# Patient Record
Sex: MALE | Race: WHITE | NOT HISPANIC OR LATINO | Employment: OTHER | URBAN - METROPOLITAN AREA
[De-identification: names, ages, dates, MRNs, and addresses within clinical notes are randomized per-mention and may not be internally consistent; named-entity substitution may affect disease eponyms.]

---

## 2017-12-20 ENCOUNTER — GENERIC CONVERSION - ENCOUNTER (OUTPATIENT)
Dept: OTHER | Facility: OTHER | Age: 72
End: 2017-12-20

## 2021-01-20 DIAGNOSIS — Z23 ENCOUNTER FOR IMMUNIZATION: ICD-10-CM

## 2021-01-22 ENCOUNTER — IMMUNIZATIONS (OUTPATIENT)
Dept: FAMILY MEDICINE CLINIC | Facility: HOSPITAL | Age: 76
End: 2021-01-22

## 2021-01-22 DIAGNOSIS — Z23 ENCOUNTER FOR IMMUNIZATION: Primary | ICD-10-CM

## 2021-01-22 PROCEDURE — 0011A SARS-COV-2 / COVID-19 MRNA VACCINE (MODERNA) 100 MCG: CPT

## 2021-01-22 PROCEDURE — 91301 SARS-COV-2 / COVID-19 MRNA VACCINE (MODERNA) 100 MCG: CPT

## 2021-02-17 ENCOUNTER — IMMUNIZATIONS (OUTPATIENT)
Dept: FAMILY MEDICINE CLINIC | Facility: HOSPITAL | Age: 76
End: 2021-02-17

## 2021-02-17 DIAGNOSIS — Z23 ENCOUNTER FOR IMMUNIZATION: Primary | ICD-10-CM

## 2021-02-17 PROCEDURE — 0012A SARS-COV-2 / COVID-19 MRNA VACCINE (MODERNA) 100 MCG: CPT

## 2021-02-17 PROCEDURE — 91301 SARS-COV-2 / COVID-19 MRNA VACCINE (MODERNA) 100 MCG: CPT

## 2022-07-04 ENCOUNTER — APPOINTMENT (EMERGENCY)
Dept: RADIOLOGY | Facility: HOSPITAL | Age: 77
DRG: 871 | End: 2022-07-04
Payer: MEDICARE

## 2022-07-04 ENCOUNTER — HOSPITAL ENCOUNTER (INPATIENT)
Facility: HOSPITAL | Age: 77
LOS: 4 days | Discharge: HOME | DRG: 871 | End: 2022-07-08
Attending: EMERGENCY MEDICINE | Admitting: STUDENT IN AN ORGANIZED HEALTH CARE EDUCATION/TRAINING PROGRAM
Payer: MEDICARE

## 2022-07-04 DIAGNOSIS — L03.90 CELLULITIS: Primary | ICD-10-CM

## 2022-07-04 LAB
ALBUMIN SERPL BCP-MCNC: 2.6 G/DL (ref 3.5–5)
ALP SERPL-CCNC: 77 U/L (ref 46–116)
ALT SERPL W P-5'-P-CCNC: 28 U/L (ref 12–78)
ANION GAP SERPL CALCULATED.3IONS-SCNC: 8 MMOL/L (ref 4–13)
APTT PPP: 23 SECONDS (ref 23–37)
AST SERPL W P-5'-P-CCNC: 18 U/L (ref 5–45)
BASOPHILS # BLD MANUAL: 0 THOUSAND/UL (ref 0–0.1)
BASOPHILS NFR MAR MANUAL: 0 % (ref 0–1)
BILIRUB SERPL-MCNC: 1.24 MG/DL (ref 0.2–1)
BUN SERPL-MCNC: 30 MG/DL (ref 5–25)
CALCIUM ALBUM COR SERPL-MCNC: 9.6 MG/DL (ref 8.3–10.1)
CALCIUM SERPL-MCNC: 8.5 MG/DL (ref 8.3–10.1)
CHLORIDE SERPL-SCNC: 98 MMOL/L (ref 100–108)
CO2 SERPL-SCNC: 25 MMOL/L (ref 21–32)
CREAT SERPL-MCNC: 1.16 MG/DL (ref 0.6–1.3)
EOSINOPHIL # BLD MANUAL: 0.17 THOUSAND/UL (ref 0–0.4)
EOSINOPHIL NFR BLD MANUAL: 1 % (ref 0–6)
ERYTHROCYTE [DISTWIDTH] IN BLOOD BY AUTOMATED COUNT: 16.5 % (ref 11.6–15.1)
GFR SERPL CREATININE-BSD FRML MDRD: 59 ML/MIN/1.73SQ M
GLUCOSE SERPL-MCNC: 136 MG/DL (ref 65–140)
HCT VFR BLD AUTO: 42.1 % (ref 36.5–49.3)
HGB BLD-MCNC: 13.6 G/DL (ref 12–17)
INR PPP: 1.16 (ref 0.84–1.19)
LACTATE SERPL-SCNC: 2.7 MMOL/L (ref 0.5–2)
LYMPHOCYTES # BLD AUTO: 1.04 THOUSAND/UL (ref 0.6–4.47)
LYMPHOCYTES # BLD AUTO: 6 % (ref 14–44)
MCH RBC QN AUTO: 32.9 PG (ref 26.8–34.3)
MCHC RBC AUTO-ENTMCNC: 32.3 G/DL (ref 31.4–37.4)
MCV RBC AUTO: 102 FL (ref 82–98)
MONOCYTES # BLD AUTO: 0.69 THOUSAND/UL (ref 0–1.22)
MONOCYTES NFR BLD: 4 % (ref 4–12)
NEUTROPHILS # BLD MANUAL: 15.41 THOUSAND/UL (ref 1.85–7.62)
NEUTS BAND NFR BLD MANUAL: 2 % (ref 0–8)
NEUTS SEG NFR BLD AUTO: 87 % (ref 43–75)
PLATELET # BLD AUTO: 186 THOUSANDS/UL (ref 149–390)
PLATELET BLD QL SMEAR: ADEQUATE
PMV BLD AUTO: 9.1 FL (ref 8.9–12.7)
POTASSIUM SERPL-SCNC: 4.8 MMOL/L (ref 3.5–5.3)
PROCALCITONIN SERPL-MCNC: 0.17 NG/ML
PROT SERPL-MCNC: 7.5 G/DL (ref 6.4–8.2)
PROTHROMBIN TIME: 14.3 SECONDS (ref 11.6–14.5)
RBC # BLD AUTO: 4.14 MILLION/UL (ref 3.88–5.62)
SODIUM SERPL-SCNC: 131 MMOL/L (ref 136–145)
WBC # BLD AUTO: 17.31 THOUSAND/UL (ref 4.31–10.16)

## 2022-07-04 PROCEDURE — 84145 PROCALCITONIN (PCT): CPT

## 2022-07-04 PROCEDURE — 99285 EMERGENCY DEPT VISIT HI MDM: CPT

## 2022-07-04 PROCEDURE — 83605 ASSAY OF LACTIC ACID: CPT

## 2022-07-04 PROCEDURE — 85610 PROTHROMBIN TIME: CPT

## 2022-07-04 PROCEDURE — 73590 X-RAY EXAM OF LOWER LEG: CPT

## 2022-07-04 PROCEDURE — 85027 COMPLETE CBC AUTOMATED: CPT

## 2022-07-04 PROCEDURE — 80053 COMPREHEN METABOLIC PANEL: CPT

## 2022-07-04 PROCEDURE — 85007 BL SMEAR W/DIFF WBC COUNT: CPT

## 2022-07-04 PROCEDURE — 99223 1ST HOSP IP/OBS HIGH 75: CPT

## 2022-07-04 PROCEDURE — 96365 THER/PROPH/DIAG IV INF INIT: CPT

## 2022-07-04 PROCEDURE — 82948 REAGENT STRIP/BLOOD GLUCOSE: CPT

## 2022-07-04 PROCEDURE — 36415 COLL VENOUS BLD VENIPUNCTURE: CPT

## 2022-07-04 PROCEDURE — 93005 ELECTROCARDIOGRAM TRACING: CPT

## 2022-07-04 PROCEDURE — 87040 BLOOD CULTURE FOR BACTERIA: CPT

## 2022-07-04 PROCEDURE — 1123F ACP DISCUSS/DSCN MKR DOCD: CPT | Performed by: INTERNAL MEDICINE

## 2022-07-04 PROCEDURE — 81001 URINALYSIS AUTO W/SCOPE: CPT

## 2022-07-04 PROCEDURE — 85730 THROMBOPLASTIN TIME PARTIAL: CPT

## 2022-07-04 RX ORDER — ALLOPURINOL 100 MG/1
100 TABLET ORAL DAILY
COMMUNITY

## 2022-07-04 RX ORDER — LEVOTHYROXINE SODIUM 0.12 MG/1
125 TABLET ORAL DAILY
COMMUNITY

## 2022-07-04 RX ORDER — ATORVASTATIN CALCIUM 20 MG/1
20 TABLET, FILM COATED ORAL DAILY
COMMUNITY

## 2022-07-04 RX ORDER — FUROSEMIDE 40 MG/1
40 TABLET ORAL DAILY
COMMUNITY

## 2022-07-04 RX ORDER — SULFASALAZINE 500 MG/1
1000 TABLET ORAL 2 TIMES DAILY
Status: DISCONTINUED | OUTPATIENT
Start: 2022-07-05 | End: 2022-07-08 | Stop reason: HOSPADM

## 2022-07-04 RX ORDER — CEFAZOLIN SODIUM 2 G/50ML
2000 SOLUTION INTRAVENOUS EVERY 8 HOURS
Status: DISCONTINUED | OUTPATIENT
Start: 2022-07-05 | End: 2022-07-08 | Stop reason: HOSPADM

## 2022-07-04 RX ORDER — ENOXAPARIN SODIUM 100 MG/ML
40 INJECTION SUBCUTANEOUS DAILY
Status: DISCONTINUED | OUTPATIENT
Start: 2022-07-05 | End: 2022-07-05

## 2022-07-04 RX ORDER — INSULIN LISPRO 100 [IU]/ML
1-6 INJECTION, SOLUTION INTRAVENOUS; SUBCUTANEOUS
Status: DISCONTINUED | OUTPATIENT
Start: 2022-07-04 | End: 2022-07-08 | Stop reason: HOSPADM

## 2022-07-04 RX ORDER — CARVEDILOL 12.5 MG/1
12.5 TABLET ORAL 2 TIMES DAILY WITH MEALS
Status: DISCONTINUED | OUTPATIENT
Start: 2022-07-05 | End: 2022-07-08 | Stop reason: HOSPADM

## 2022-07-04 RX ORDER — BENAZEPRIL HYDROCHLORIDE 40 MG/1
40 TABLET, FILM COATED ORAL DAILY
COMMUNITY

## 2022-07-04 RX ORDER — OXYCODONE HYDROCHLORIDE 10 MG/1
10 TABLET ORAL EVERY 6 HOURS PRN
Status: DISCONTINUED | OUTPATIENT
Start: 2022-07-04 | End: 2022-07-08 | Stop reason: HOSPADM

## 2022-07-04 RX ORDER — CARVEDILOL 12.5 MG/1
12.5 TABLET ORAL 2 TIMES DAILY WITH MEALS
COMMUNITY

## 2022-07-04 RX ORDER — ATORVASTATIN CALCIUM 20 MG/1
20 TABLET, FILM COATED ORAL DAILY
Status: DISCONTINUED | OUTPATIENT
Start: 2022-07-05 | End: 2022-07-08 | Stop reason: HOSPADM

## 2022-07-04 RX ORDER — LISINOPRIL 10 MG/1
40 TABLET ORAL DAILY
Status: DISCONTINUED | OUTPATIENT
Start: 2022-07-05 | End: 2022-07-06

## 2022-07-04 RX ORDER — INSULIN LISPRO 100 [IU]/ML
2-12 INJECTION, SOLUTION INTRAVENOUS; SUBCUTANEOUS
Status: DISCONTINUED | OUTPATIENT
Start: 2022-07-05 | End: 2022-07-08 | Stop reason: HOSPADM

## 2022-07-04 RX ORDER — OXYCODONE HYDROCHLORIDE 15 MG/1
10 TABLET ORAL EVERY 6 HOURS PRN
COMMUNITY

## 2022-07-04 RX ORDER — ALLOPURINOL 100 MG/1
100 TABLET ORAL DAILY
Status: DISCONTINUED | OUTPATIENT
Start: 2022-07-05 | End: 2022-07-08 | Stop reason: HOSPADM

## 2022-07-04 RX ORDER — FUROSEMIDE 40 MG/1
40 TABLET ORAL DAILY
Status: DISCONTINUED | OUTPATIENT
Start: 2022-07-05 | End: 2022-07-08 | Stop reason: HOSPADM

## 2022-07-04 RX ORDER — LEVOTHYROXINE SODIUM 0.12 MG/1
125 TABLET ORAL DAILY
Status: DISCONTINUED | OUTPATIENT
Start: 2022-07-05 | End: 2022-07-08 | Stop reason: HOSPADM

## 2022-07-04 RX ORDER — SULFASALAZINE 500 MG/1
1000 TABLET ORAL 2 TIMES DAILY
COMMUNITY

## 2022-07-04 RX ADMIN — CEFEPIME HYDROCHLORIDE 2000 MG: 2 INJECTION, POWDER, FOR SOLUTION INTRAVENOUS at 21:02

## 2022-07-04 RX ADMIN — SODIUM CHLORIDE 1000 ML: 0.9 INJECTION, SOLUTION INTRAVENOUS at 22:10

## 2022-07-05 PROBLEM — N17.9 ACUTE KIDNEY INJURY (HCC): Status: ACTIVE | Noted: 2022-07-05

## 2022-07-05 LAB
ANION GAP SERPL CALCULATED.3IONS-SCNC: 11 MMOL/L (ref 4–13)
BACTERIA UR QL AUTO: ABNORMAL /HPF
BASOPHILS # BLD AUTO: 0.03 THOUSANDS/ΜL (ref 0–0.1)
BASOPHILS NFR BLD AUTO: 0 % (ref 0–1)
BILIRUB UR QL STRIP: ABNORMAL
BUN SERPL-MCNC: 29 MG/DL (ref 5–25)
CALCIUM SERPL-MCNC: 7.6 MG/DL (ref 8.3–10.1)
CHLORIDE SERPL-SCNC: 98 MMOL/L (ref 100–108)
CLARITY UR: ABNORMAL
CO2 SERPL-SCNC: 25 MMOL/L (ref 21–32)
COLOR UR: ABNORMAL
CREAT SERPL-MCNC: 1.52 MG/DL (ref 0.6–1.3)
EOSINOPHIL # BLD AUTO: 0.11 THOUSAND/ΜL (ref 0–0.61)
EOSINOPHIL NFR BLD AUTO: 1 % (ref 0–6)
ERYTHROCYTE [DISTWIDTH] IN BLOOD BY AUTOMATED COUNT: 16.5 % (ref 11.6–15.1)
GFR SERPL CREATININE-BSD FRML MDRD: 43 ML/MIN/1.73SQ M
GLUCOSE SERPL-MCNC: 110 MG/DL (ref 65–140)
GLUCOSE SERPL-MCNC: 120 MG/DL (ref 65–140)
GLUCOSE SERPL-MCNC: 131 MG/DL (ref 65–140)
GLUCOSE SERPL-MCNC: 138 MG/DL (ref 65–140)
GLUCOSE SERPL-MCNC: 149 MG/DL (ref 65–140)
GLUCOSE SERPL-MCNC: 199 MG/DL (ref 65–140)
GLUCOSE UR STRIP-MCNC: NEGATIVE MG/DL
HCT VFR BLD AUTO: 37 % (ref 36.5–49.3)
HGB BLD-MCNC: 12.2 G/DL (ref 12–17)
HGB UR QL STRIP.AUTO: NEGATIVE
HYALINE CASTS #/AREA URNS LPF: ABNORMAL /LPF
IMM GRANULOCYTES # BLD AUTO: 0.09 THOUSAND/UL (ref 0–0.2)
IMM GRANULOCYTES NFR BLD AUTO: 1 % (ref 0–2)
KETONES UR STRIP-MCNC: NEGATIVE MG/DL
LACTATE SERPL-SCNC: 2.1 MMOL/L (ref 0.5–2)
LEUKOCYTE ESTERASE UR QL STRIP: NEGATIVE
LYMPHOCYTES # BLD AUTO: 0.84 THOUSANDS/ΜL (ref 0.6–4.47)
LYMPHOCYTES NFR BLD AUTO: 7 % (ref 14–44)
MCH RBC QN AUTO: 33.4 PG (ref 26.8–34.3)
MCHC RBC AUTO-ENTMCNC: 33 G/DL (ref 31.4–37.4)
MCV RBC AUTO: 101 FL (ref 82–98)
MONOCYTES # BLD AUTO: 0.69 THOUSAND/ΜL (ref 0.17–1.22)
MONOCYTES NFR BLD AUTO: 6 % (ref 4–12)
MUCOUS THREADS UR QL AUTO: ABNORMAL
NEUTROPHILS # BLD AUTO: 9.55 THOUSANDS/ΜL (ref 1.85–7.62)
NEUTS SEG NFR BLD AUTO: 85 % (ref 43–75)
NITRITE UR QL STRIP: NEGATIVE
NON-SQ EPI CELLS URNS QL MICRO: ABNORMAL /HPF
NRBC BLD AUTO-RTO: 0 /100 WBCS
PH UR STRIP.AUTO: 5.5 [PH]
PLATELET # BLD AUTO: 291 THOUSANDS/UL (ref 149–390)
PMV BLD AUTO: 9.2 FL (ref 8.9–12.7)
POTASSIUM SERPL-SCNC: 4.2 MMOL/L (ref 3.5–5.3)
PROT UR STRIP-MCNC: ABNORMAL MG/DL
RBC # BLD AUTO: 3.65 MILLION/UL (ref 3.88–5.62)
RBC #/AREA URNS AUTO: ABNORMAL /HPF
SODIUM SERPL-SCNC: 134 MMOL/L (ref 136–145)
SP GR UR STRIP.AUTO: >=1.03 (ref 1–1.03)
UROBILINOGEN UR QL STRIP.AUTO: 0.2 E.U./DL
WBC # BLD AUTO: 11.31 THOUSAND/UL (ref 4.31–10.16)
WBC #/AREA URNS AUTO: ABNORMAL /HPF

## 2022-07-05 PROCEDURE — 82948 REAGENT STRIP/BLOOD GLUCOSE: CPT

## 2022-07-05 PROCEDURE — 80048 BASIC METABOLIC PNL TOTAL CA: CPT

## 2022-07-05 PROCEDURE — 99232 SBSQ HOSP IP/OBS MODERATE 35: CPT | Performed by: INTERNAL MEDICINE

## 2022-07-05 PROCEDURE — 36415 COLL VENOUS BLD VENIPUNCTURE: CPT

## 2022-07-05 PROCEDURE — 85025 COMPLETE CBC W/AUTO DIFF WBC: CPT

## 2022-07-05 RX ORDER — HEPARIN SODIUM 5000 [USP'U]/ML
5000 INJECTION, SOLUTION INTRAVENOUS; SUBCUTANEOUS EVERY 8 HOURS SCHEDULED
Status: DISCONTINUED | OUTPATIENT
Start: 2022-07-05 | End: 2022-07-08 | Stop reason: HOSPADM

## 2022-07-05 RX ORDER — NYSTATIN 100000 [USP'U]/G
POWDER TOPICAL 2 TIMES DAILY
Status: DISCONTINUED | OUTPATIENT
Start: 2022-07-05 | End: 2022-07-05

## 2022-07-05 RX ORDER — ONDANSETRON 2 MG/ML
4 INJECTION INTRAMUSCULAR; INTRAVENOUS EVERY 8 HOURS PRN
Status: DISCONTINUED | OUTPATIENT
Start: 2022-07-05 | End: 2022-07-08 | Stop reason: HOSPADM

## 2022-07-05 RX ORDER — NYSTATIN 100000 [USP'U]/G
POWDER TOPICAL 2 TIMES DAILY
Status: DISCONTINUED | OUTPATIENT
Start: 2022-07-05 | End: 2022-07-08 | Stop reason: HOSPADM

## 2022-07-05 RX ADMIN — LISINOPRIL 40 MG: 10 TABLET ORAL at 09:07

## 2022-07-05 RX ADMIN — SULFASALAZINE 1000 MG: 500 TABLET ORAL at 17:40

## 2022-07-05 RX ADMIN — HEPARIN SODIUM 5000 UNITS: 5000 INJECTION INTRAVENOUS; SUBCUTANEOUS at 21:46

## 2022-07-05 RX ADMIN — OXYCODONE HYDROCHLORIDE 10 MG: 10 TABLET ORAL at 00:07

## 2022-07-05 RX ADMIN — CARVEDILOL 12.5 MG: 12.5 TABLET, FILM COATED ORAL at 09:07

## 2022-07-05 RX ADMIN — CEFAZOLIN SODIUM 2000 MG: 2 SOLUTION INTRAVENOUS at 09:13

## 2022-07-05 RX ADMIN — CEFAZOLIN SODIUM 2000 MG: 2 SOLUTION INTRAVENOUS at 16:23

## 2022-07-05 RX ADMIN — SULFASALAZINE 1000 MG: 500 TABLET ORAL at 09:07

## 2022-07-05 RX ADMIN — OXYCODONE HYDROCHLORIDE 10 MG: 10 TABLET ORAL at 09:25

## 2022-07-05 RX ADMIN — ATORVASTATIN CALCIUM 20 MG: 20 TABLET, FILM COATED ORAL at 09:07

## 2022-07-05 RX ADMIN — OXYCODONE HYDROCHLORIDE 10 MG: 10 TABLET ORAL at 16:23

## 2022-07-05 RX ADMIN — ENOXAPARIN SODIUM 40 MG: 40 INJECTION SUBCUTANEOUS at 09:08

## 2022-07-05 RX ADMIN — LEVOTHYROXINE SODIUM 125 MCG: 125 TABLET ORAL at 09:07

## 2022-07-05 RX ADMIN — ALLOPURINOL 100 MG: 100 TABLET ORAL at 12:17

## 2022-07-05 RX ADMIN — HEPARIN SODIUM 5000 UNITS: 5000 INJECTION INTRAVENOUS; SUBCUTANEOUS at 14:02

## 2022-07-05 RX ADMIN — FUROSEMIDE 40 MG: 40 TABLET ORAL at 09:07

## 2022-07-05 RX ADMIN — CARVEDILOL 12.5 MG: 12.5 TABLET, FILM COATED ORAL at 16:23

## 2022-07-05 NOTE — ASSESSMENT & PLAN NOTE
· Sodium slightly improving  · Possibly in the setting of home diuretic usage   · Continue to monitor

## 2022-07-05 NOTE — PROGRESS NOTES
[facility]  Progress Note - Jennie Aldridge 1945, 64 y o  male MRN: [id number]  Unit/Bed#: ED 12 Encounter: [id number]  Primary Care Provider: No primary care provider on file  Date and time admitted to hospital: 7/4/2022  7:37 PM    * Cellulitis of right lower extremity  Assessment & Plan  · Patient states that pain in right lower extremity has improved at this time  · RLE with uniform erythematous rash, warm and tender to palpation without underlying fluctuance felt  · Meets SEPSIS criteria as below   · X-ray right lower extremity showing soft tissue swelling  · With no SOB, chest pain, hypoxia lower suspicion of PE/DVT  · Blood culture x2 pending  · ED gave IV cefepime  · Continue IV cefazolin  · trend WBC, follow up with pending infectious labs, prn pain control     Acute kidney injury ([facility] )  Assessment & Plan  · Baseline creatinine appears to be around 1  · Creatinine 1 52 currently  · Patient did receive Lasix today    If creatinine up trending will likely discontinue Lasix tomorrow  · Continue to monitor    CHF (congestive heart failure) ([facility] )  Assessment & Plan  Wt Readings from Last 3 Encounters:   07/04/22 125 kg (275 lb)     · Patient denies any shortness of breath on examination  · Lower extremities are swollen but patient states this is his baseline  · Continue home lasix and other home HTN medications  · Continue to monitor    Sepsis ([facility] )  Assessment & Plan  · As evidenced by HR >90, WBC 17 31, and RLE cellulitis as above   · Plan as above  · Receiving IV abx as above    Hyponatremia  Assessment & Plan  · Sodium slightly improving  · Possibly in the setting of home diuretic usage   · Continue to monitor    T2DM (type 2 diabetes mellitus) ([facility] )  Assessment & Plan  · Hold home metformin  · Correctional SSI  · Hypoglycemia protocol  · Diabetic diet   (P) [phone] [id number]      Rheumatoid arthritis ([facility] )  Assessment & Plan  · Continue home sulfasalazine         VTE Pharmacologic Prophylaxis: VTE Score: 6 Moderate Risk (Score 3-4) - Pharmacological DVT Prophylaxis Ordered: heparin  Patient Centered Rounds: I performed bedside rounds with nursing staff today  Discussions with Specialists or Other Care Team Provider: case management    Education and Discussions with Family / Patient: Patient declined call to   Time Spent for Care: 30 minutes  More than 50% of total time spent on counseling and coordination of care as described above  Current Length of Stay: 1 day(s)  Current Patient Status: Inpatient   Certification Statement: The patient will continue to require additional inpatient hospital stay due to cellulitis  Discharge Plan: Anticipate discharge in 48-72 hrs to home  Code Status: Level 1 - Full Code    Subjective:   Patient resting comfortably on examination  Patient states that his lower extremity pain has improved compared to yesterday  Patient had no other complaints on exam     Objective:     Vitals:   Temp (24hrs), Av 5 °F (36 9 °C), Min:98 5 °F (36 9 °C), Max:98 5 °F (36 9 °C)    Temp:  [98 5 °F (36 9 °C)] 98 5 °F (36 9 °C)  HR:  [60-99] 60  Resp:  [14-20] 16  BP: (119-125)/(57-68) 123/58  SpO2:  [95 %-98 %] 95 %  Body mass index is 38 35 kg/m²  Input and Output Summary (last 24 hours): Intake/Output Summary (Last 24 hours) at 2022 1324  Last data filed at 2022 1301  Gross per 24 hour   Intake 1120 ml   Output 805 ml   Net 315 ml       Physical Exam:   Physical Exam  Vitals and nursing note reviewed  Constitutional:       General: He is not in acute distress  Appearance: He is well-developed  HENT:      Head: Normocephalic and atraumatic  Eyes:      General: No scleral icterus  Conjunctiva/sclera: Conjunctivae normal    Cardiovascular:      Rate and Rhythm: Normal rate and regular rhythm  Heart sounds: Normal heart sounds  No murmur heard  No friction rub  No gallop     Pulmonary:      Effort: Pulmonary effort is normal  No respiratory distress  Breath sounds: Normal breath sounds  No wheezing or rales  Abdominal:      General: Bowel sounds are normal  There is no distension  Palpations: Abdomen is soft  Tenderness: There is no abdominal tenderness  Musculoskeletal:         General: Normal range of motion  Comments: Right lower extremity with erythema noted  Right lower extremity warmer when compared to left  Minimal tenderness to palpation right lower extremity   Skin:     General: Skin is warm  Findings: No rash  Neurological:      Mental Status: He is alert and oriented to person, place, and time            Additional Data:     Labs:  Results from last 7 days   Lab Units 07/05/22  0506 07/04/22  2042   WBC Thousand/uL 11 31* 17 31*   HEMOGLOBIN g/dL 12 2 13 6   HEMATOCRIT % 37 0 42 1   PLATELETS Thousands/uL 291 186   BANDS PCT %  --  2   NEUTROS PCT % 85*  --    LYMPHS PCT % 7*  --    LYMPHO PCT %  --  6*   MONOS PCT % 6  --    MONO PCT %  --  4   EOS PCT % 1 1     Results from last 7 days   Lab Units 07/05/22  0941 07/04/22  2042   SODIUM mmol/L 134* 131*   POTASSIUM mmol/L 4 2 4 8   CHLORIDE mmol/L 98* 98*   CO2 mmol/L 25 25   BUN mg/dL 29* 30*   CREATININE mg/dL 1 52* 1 16   ANION GAP mmol/L 11 8   CALCIUM mg/dL 7 6* 8 5   ALBUMIN g/dL  --  2 6*   TOTAL BILIRUBIN mg/dL  --  1 24*   ALK PHOS U/L  --  77   ALT U/L  --  28   AST U/L  --  18   GLUCOSE RANDOM mg/dL 199* 136     Results from last 7 days   Lab Units 07/04/22  2042   INR  1 16     Results from last 7 days   Lab Units 07/05/22  1051 07/05/22  0657 07/04/22  2350   POC GLUCOSE mg/dl 138 110 149*         Results from last 7 days   Lab Units 07/04/22  2359 07/04/22  2042   LACTIC ACID mmol/L 2 1* 2 7*   PROCALCITONIN ng/ml  --  0 17       Lines/Drains:  Invasive Devices  Report    Peripheral Intravenous Line  Duration           Peripheral IV 07/04/22 Left Antecubital 1 day    Peripheral IV 07/04/22 Right Hand <1 day                      Imaging: No pertinent imaging reviewed  Recent Cultures (last 7 days):   Results from last 7 days   Lab Units 07/04/22 2048 07/04/22 2042   BLOOD CULTURE  Received in Microbiology Lab  Culture in Progress  Received in Microbiology Lab  Culture in Progress  Last 24 Hours Medication List:   Current Facility-Administered Medications   Medication Dose Route Frequency Provider Last Rate    allopurinol  100 mg Oral Daily Zara [place], [place]      atorvastatin  20 mg Oral Daily [facility] [place]      carvedilol  12 5 mg Oral BID With Meals Zara [place]      cefazolin  2,000 mg Intravenous Q8H Zara Pagan PA-C 2,000 mg (07/05/22 0913)    furosemide  40 mg Oral Daily Laya Schwab Theodore ALFONSO lin      heparin (porcine)  5,000 Units Subcutaneous [facility] Drewjaret Wolf, DO      insulin lispro  1-6 Units Subcutaneous HS Ellan Schwab [place], [place]      insulin lispro  2-12 Units Subcutaneous TID AC Laya Schwab [place], [place]      levothyroxine  125 mcg Oral Daily Ellan Schwab [place], [place]      lisinopril  40 mg Oral Daily Ellan Schwab [place], [place]      ondansetron  4 mg Intravenous Q8H PRN Drewjaret Wolf, DO      oxyCODONE  10 mg Oral Q6H PRN Ellan Schwab [place], [place]      sulfaSALAzine  1,000 mg Oral BID Zara P[place], [place]          Today, Patient Was Seen By: Drew Wolf DO    **Please Note: This note may have been constructed using a voice recognition system  **

## 2022-07-05 NOTE — ASSESSMENT & PLAN NOTE
Wt Readings from Last 3 Encounters:   07/04/22 125 kg (275 lb)     · Patient denies any shortness of breath on examination  · Lower extremities are swollen but patient states this is his baseline  · Continue home lasix and other home HTN medications  · Continue to monitor

## 2022-07-05 NOTE — DISCHARGE INSTR - OTHER ORDERS
Skin care plans:  1-Cleanse sacro-buttocks with soap and water  Apply TRIAD paste to open areas on open area on buttock at least twice a day and as needed with jacky-care  2-Hydraguard to bilateral heel twice a day and as needed  3-Elevate heels to offload pressure  4-Ehob cushion when out of bed  5-Turn/repoisiton every 2 hours for pressure re-distribution on skin  6-Moisturize skin daily with skin nourishing cream    7- Right leg wounds- Cleanse with Normal Saline solution, pat dry  Apply a piece of Maxorb calcium alginate over open wound bed, cover with 4x4 gauze, then ABD pad, and wrap with Lizzy  Change once or twice a day depending on amount of drainage from wound  8- Elevate lower extremities frequently throughout the day when sitting down  Follow-up with [facility] wound care center as an outpatient- call [phone] for appt

## 2022-07-05 NOTE — ED PROVIDER NOTES
History  Chief Complaint   Patient presents with    Leg Pain     Pt c/o R lower leg pain with swelling & rash     Patient is a 64year old male presenting to the ED with a complaint of right leg pain  Reports the pain started this morning at 10am  Reports he noticed a painful red patch on the back of the right leg  Reports the patch has since spread to the entire leg  Reports he went to urgent care, they noted he had a fever and told him to go to the ED for further evaluation  Reports he has been having chills today  Reports the pain associated feels like a pressure throughout the lower leg  Reports taking his daily oxycodone for his arthritis which lessened the pain in the leg  Reports it is painful to touch and painful to weight bear  Reports having a history of rheumatoid arthritis and bilateral lower extremity edema  Reports the degree of swelling he has today is what he typically has  Reports having a slight "pink" coloration to his shins at baseline  Denies any known injury to the area  Reports previous knee replacement of the right knee 4 years ago  Denies itching, fevers, headache, weakness, dizziness, visual changes, abdominal pain, nausea, vomiting, diarrhea, constipation, chest pain, shortness of breath or difficulty breathing  Does not offer any other concerns or complaints        History provided by:  Patient and spouse   used: No    Leg Pain  Location:  Leg  Time since incident:  10 hours  Leg location:  R lower leg  Pain details:     Quality:  Pressure    Radiates to:  Does not radiate    Severity:  Mild    Onset quality:  Sudden    Duration:  10 hours    Timing:  Constant    Progression:  Worsening  Prior injury to area:  No  Worsened by:  Bearing weight  Associated symptoms: swelling    Associated symptoms: no back pain, no decreased ROM, no fatigue, no fever, no itching, no muscle weakness, no neck pain, no numbness, no stiffness and no tingling    Swelling:     Location:  Leg (right lower)    Onset quality:  Sudden    Duration:  10 hours    Timing:  Constant    Progression:  Worsening      None       Past Medical History:   Diagnosis Date    Arthritis     Edema     Rheumatoid arthritis ([address] )        No past surgical history on file  No family history on file  I have reviewed and agree with the history as documented  E-Cigarette/Vaping     E-Cigarette/Vaping Substances          Review of Systems   Constitutional: Negative for chills, fatigue and fever  HENT: Negative for ear pain and sore throat  Eyes: Negative for pain and visual disturbance  Respiratory: Negative for cough and shortness of breath  Cardiovascular: Negative for chest pain and palpitations  Gastrointestinal: Negative for abdominal pain and vomiting  Genitourinary: Negative for dysuria and hematuria  Musculoskeletal: Negative for arthralgias, back pain, neck pain and stiffness  Right lower leg pain   Skin: Positive for rash (right lower leg)  Negative for color change and itching  Neurological: Negative for seizures and syncope  All other systems reviewed and are negative  Physical Exam  Physical Exam  Vitals and nursing note reviewed  Constitutional:       Appearance: Normal appearance  HENT:      Head: Normocephalic and atraumatic  Right Ear: External ear normal       Left Ear: External ear normal       Nose: Nose normal       Mouth/Throat:      Mouth: Mucous membranes are moist    Eyes:      General: No scleral icterus  Right eye: No discharge  Left eye: No discharge  Conjunctiva/sclera: Conjunctivae normal    Cardiovascular:      Rate and Rhythm: Normal rate  Pulmonary:      Effort: Pulmonary effort is normal  No respiratory distress  Abdominal:      General: Abdomen is flat  Bowel sounds are normal       Tenderness: There is no abdominal tenderness  Musculoskeletal:         General: No swelling, deformity or signs of injury   Normal range of motion  Cervical back: Normal range of motion and neck supple  No rigidity  Skin:     General: Skin is warm and dry  Capillary Refill: Capillary refill takes less than 2 seconds  Coloration: Skin is not jaundiced  Findings: Erythema present  Comments: Erythema of the anterior and posterior right lower leg  The erythema spare the right foot  Neurological:      General: No focal deficit present  Mental Status: He is alert and oriented to person, place, and time  Mental status is at baseline  Cranial Nerves: No cranial nerve deficit  Gait: Gait normal    Psychiatric:         Mood and Affect: Mood normal          Behavior: Behavior normal          Thought Content: Thought content normal          Judgment: Judgment normal          Vital Signs  ED Triage Vitals [07/04/22 1931]   Temperature Pulse Respirations Blood Pressure SpO2   98 5 °F (36 9 °C) 99 20 125/60 98 %      Temp src Heart Rate Source Patient Position - Orthostatic VS BP Location FiO2 (%)   -- Monitor Sitting Left arm --      Pain Score       --           Vitals:    07/04/22 1931   BP: 125/60   Pulse: 99   Patient Position - Orthostatic VS: Sitting         Visual Acuity      ED Medications  Medications   sodium chloride 0 9 % bolus 1,000 mL (1,000 mL Intravenous New Bag 7/4/22 2210)   cefepime (MAXIPIME) 2 g/50 mL dextrose IVPB (0 mg Intravenous Stopped 7/4/22 2149)       Diagnostic Studies  Results Reviewed     Procedure Component Value Units Date/Time    CBC and differential [[id number]]  (Abnormal) Collected: 07/04/22 2042    Lab Status: Final result Specimen: Blood from Arm, Left Updated: 07/04/22 2148     WBC 17 31 Thousand/uL      RBC 4 14 Million/uL      Hemoglobin 13 6 g/dL      Hematocrit 42 1 %       fL      MCH 32 9 pg      MCHC 32 3 g/dL      RDW 16 5 %      MPV 9 1 fL      Platelets 628 Thousands/uL     Narrative: This is an appended report    These results have been appended to a previously verified report  Manual Differential(PHLEBS Do Not Order) [[id number]]  (Abnormal) Collected: 07/04/22 2042    Lab Status: Final result Specimen: Blood from Arm, Left Updated: 07/04/22 2148     Segmented % 87 %      Bands % 2 %      Lymphocytes % 6 %      Monocytes % 4 %      Eosinophils, % 1 %      Basophils % 0 %      Absolute Neutrophils 15 41 Thousand/uL      Lymphocytes Absolute 1 04 Thousand/uL      Monocytes Absolute 0 69 Thousand/uL      Eosinophils Absolute 0 17 Thousand/uL      Basophils Absolute 0 00 Thousand/uL      Total Counted --     Platelet Estimate Adequate    Lactic acid [[id number]]  (Abnormal) Collected: 07/04/22 2042    Lab Status: Final result Specimen: Blood from Arm, Left Updated: 07/04/22 2129     LACTIC ACID 2 7 mmol/L     Narrative:      Result may be elevated if tourniquet was used during collection      Lactic acid 2 Hours [[id number]]     Lab Status: No result Specimen: Blood     Protime-INR [[id number]]  (Normal) Collected: 07/04/22 2042    Lab Status: Final result Specimen: Blood from Arm, Left Updated: 07/04/22 2128     Protime 14 3 seconds      INR 1 16    APTT [[id number]]  (Normal) Collected: 07/04/22 2042    Lab Status: Final result Specimen: Blood from Arm, Left Updated: 07/04/22 2128     PTT 23 seconds     Procalcitonin [[id number]]  (Normal) Collected: 07/04/22 2042    Lab Status: Final result Specimen: Blood from Arm, Left Updated: 07/04/22 2124     Procalcitonin 0 17 ng/ml     Comprehensive metabolic panel [[id number]]  (Abnormal) Collected: 07/04/22 2042    Lab Status: Final result Specimen: Blood from Arm, Left Updated: 07/04/22 2112     Sodium 131 mmol/L      Potassium 4 8 mmol/L      Chloride 98 mmol/L      CO2 25 mmol/L      ANION GAP 8 mmol/L      BUN 30 mg/dL      Creatinine 1 16 mg/dL      Glucose 136 mg/dL      Calcium 8 5 mg/dL      Corrected Calcium 9 6 mg/dL      AST 18 U/L      ALT 28 U/L      Alkaline Phosphatase 77 U/L      Total Protein 7 5 g/dL Albumin 2 6 g/dL      Total Bilirubin 1 24 mg/dL      eGFR 59 ml/min/1 73sq m     Narrative:      Isaias guidelines for Chronic Kidney Disease (CKD):     Stage 1 with normal or high GFR (GFR > 90 mL/min/1 73 square meters)    Stage 2 Mild CKD (GFR = 60-89 mL/min/1 73 square meters)    Stage 3A Moderate CKD (GFR = 45-59 mL/min/1 73 square meters)    Stage 3B Moderate CKD (GFR = 30-44 mL/min/1 73 square meters)    Stage 4 Severe CKD (GFR = 15-29 mL/min/1 73 square meters)    Stage 5 End Stage CKD (GFR <15 mL/min/1 73 square meters)  Note: GFR calculation is accurate only with a steady state creatinine    Blood culture #2 [[id number]] Collected: 07/04/22 2048    Lab Status: In process Specimen: Blood Updated: 07/04/22 2048    Blood culture #1 [[id number]] Collected: 07/04/22 2042    Lab Status: No result Specimen: Blood from Arm, Left     UA w Reflex to Microscopic w Reflex to Culture [[id number]]     Lab Status: No result Specimen: Urine                  XR tibia fibula 2 views RIGHT    (Results Pending)              Procedures  Procedures         ED Course             MDM  Number of Diagnoses or Management Options  Cellulitis: new and requires workup  Diagnosis management comments: This is a 64year old male presetning to the ED with a painful right lower leg  Reports noticing the pain and a small red rash this morning at 10am  Reports the rash spread from the posterior lower right leg to the entirety of the right lower leg  Reports the pain is a pressure sensation  Denies fevers, headache, weakness, dizziness, visual changes, abdominal pain, nausea, vomiting, diarrhea, constipation, chest pain, shortness of breath or difficulty breathing      Differential diagnosis to include but is not limited to: cellulitis, sepsis    Initial ED Plan: CBC, CMP, lactic acid, procalcitonin, PT/PTT, blood culture, UA, EKG, Xray right tibia/fibula    -risks and benefits were discussed about Sepsis protocol of 30cc/kg of fluids  Patient refused the protocol fluid resuscitation due to swelling of bilateral legs, stated he would rather 1 liter of fluid with reevaluation  ED results: elevated WBC, lactic 2 7    Final ED assessment: Patient is stable and well appearing  Patient verbalized understanding and is agreeable with the plan for admission  Case was discussed with Jax Agustin, inpatient under Dr Claudette Bloomer  Bridging orders placed  Amount and/or Complexity of Data Reviewed  Clinical lab tests: ordered and reviewed  Tests in the radiology section of CPT®: ordered and reviewed  Tests in the medicine section of CPT®: ordered and reviewed  Obtain history from someone other than the patient: yes  Discuss the patient with other providers: yes  Independent visualization of images, tracings, or specimens: yes        Disposition  Final diagnoses:   Cellulitis     Time reflects when diagnosis was documented in both MDM as applicable and the Disposition within this note     Time User Action Codes Description Comment    7/4/2022 10:26 PM Farida Brigette Add [L03 90] Cellulitis     7/4/2022 10:27 PM Farida Brigette Add [A41 9,  R65 20] Severe sepsis ([address] )     7/4/2022 10:27 PM Farida Brigette Remove [A41 9,  R65 20] Severe sepsis [facility])       ED Disposition     ED Disposition   Admit    Condition   Stable    Date/Time   Mon Jul 4, 2022 10:26 PM    Comment   Case was discussed with Jax Agustin and the patient's admission status was agreed to be Admission Status: inpatient status to the service of Dr Claudette Bloomer   Follow-up Information    None         Patient's Medications    No medications on file       No discharge procedures on file      PDMP Review     None          ED Provider  Electronically Signed by           Cortez Lance PA-C  07/04/22 7997

## 2022-07-05 NOTE — ASSESSMENT & PLAN NOTE
· As evidenced by HR >90, WBC 17 31, and RLE cellulitis as above   · Plan as above  · Receiving IV abx as above

## 2022-07-05 NOTE — ASSESSMENT & PLAN NOTE
· Hold home metformin  · Correctional SSI  · Hypoglycemia protocol  · Diabetic diet   (P) [phone]

## 2022-07-05 NOTE — ASSESSMENT & PLAN NOTE
Reports development of painful red patch on his right calf this AM  This rash has now spread to the entire RLE  He went to urgent care who reportedly found he was febrile and requested he be evaluated in ED  Reports his home oxy dosing has helped with the pain  Denies other symptom or complaint at this time      /60 (BP Location: Left arm)   Pulse 99   Temp 98 5 °F (36 9 °C)   Resp 20   Ht 5' 11" (1 803 m)   Wt 125 kg (275 lb)   SpO2 98%   BMI 38 35 kg/m²     · Reporting RLE painful red patch on the calf this AM, which has spread to the entire lower extremity  · Reports going to urgent care who found he was febrile and suggested ED presentation  · RLE with uniform erythematous rash, warm and tender to palpation without underlying fluctuance felt  · Reports improvement in pain with home oxy  · Meets SEPSIS criteria as below   · XR RLE pending official read   · With no SOB, chest pain, hypoxia lower suspicion of PE/DVT  · ED gave IV cefepime  · Continue IV cefepime, trend WBC, follow up with pending infectious labs, prn pain control

## 2022-07-05 NOTE — NURSING NOTE
Patient reports feeling uncomfortable while laying on stretcher with legs elevated  Per patient request, pillow and blankets removed from under feet  Importance of elevating lower extremities explained

## 2022-07-05 NOTE — ED NOTES
Patient in bell bed  PIV access obtained  x1 set of Blood Cx obtained at this time  PA aware       Andrea Sharp RN  07/04/22 4803

## 2022-07-05 NOTE — WOUND OSTOMY CARE
Progress Note - Wound   Yahir Reyes 64 y o  male MRN: [id number]  Unit/Bed#: ED 12 Encounter: [id number]      Assessment:   Patient admitted due to cellulitis of right lower extremity  History arthritis, edema, RA, CHF, diabetes  Wound care consulted for sacral wounds  Patient agreeable to assessment, alert and oriented x 4, continent of bowel and bladder, standby assist to stand at edge of stretcher for assessment, is independent with most care  Primary RN made aware of assessment findings  Patient states buttock wound occurred approx 2 weeks ago due to sitting in his recliner often and wife has been assisting with wound care  1  Pressure injury bilateral buttock, stage 3- POA- Wounds located on each buttock, pictured and measured together  Wounds are oval in shape, full-thickness, approx  40% yellow adhered tissue and 60% non-blanchable beefy red and pink tissue, with scant amount of serous drainage noted  Jacky-wounds are dry, intact, blanchable pink skin, blanchable red skin, no warmth, no induration  2  Bilateral sacrum and heels- skin is dry, intact, blanchable pink  3  Right lower extremity- Skin is dry, intact, no open aspects, no drainage noted  Skin is erythematous, edematous, and warm to touch- SLIM aware and patient being treated for cellulitis  Educated patient on importance of frequent offloading of pressure via turning, repositioning, and weight-shifting  Verbalized understanding of plan of care  No induration, fluctuance, odor, warmth, or purulence noted to the above noted wounds  Patient tolerated well, reports mild pain to the wounds  Primary nurse aware of plan of care  See flow sheets for more detailed assessment findings  Will follow along  Skin care plans:  1-Cleanse sacro-buttocks with soap and water  Apply TRIAD paste to open areas on each buttock at least twice a day and as needed with jacky-care   May keep open to air due to a dressing for this area is difficult  2-Hydraguard to bilateral heel BID and PRN  3-Elevate heels to offload pressure  4-Ehob cushion when out of bed  5-Turn/repoisiton q2h for pressure re-distribution on skin  6-Moisturize skin daily with skin nourishing cream    7-Apply Accu-max pump to mattress  Wound 07/05/22 Buttocks (Active)   Wound Image   07/05/22 1206   Wound Description Beefy red;Yellow 07/05/22 1206   Pressure Injury Stage 3 07/05/22 1206   Irina-wound Assessment Dry; Intact; Pink 07/05/22 1206   Wound Length (cm) 2 cm 07/05/22 1206   Wound Width (cm) 3 cm 07/05/22 1206   Wound Depth (cm) 0 2 cm 07/05/22 1206   Wound Surface Area (cm^2) 6 cm^2 07/05/22 1206   Wound Volume (cm^3) 1 2 cm^3 07/05/22 1206   Calculated Wound Volume (cm^3) 1 2 cm^3 07/05/22 1206   Tunneling 0 cm 07/05/22 1206   Undermining 0 07/05/22 1206   Drainage Amount Scant 07/05/22 1206   Drainage Description Serous 07/05/22 1206   Non-staged Wound Description Full thickness 07/05/22 1206   Treatments Cleansed;Irrigation with NSS;Site care 07/05/22 1206   Dressing Protective barrier 07/05/22 1206   Wound packed?  No 07/05/22 1206   Dressing Changed New 07/05/22 1206   Patient Tolerance Tolerated well 07/05/22 1206     Call or tigertext with any questions  Wound Care will continue to follow    Melodie Sebastian care

## 2022-07-05 NOTE — ASSESSMENT & PLAN NOTE
· As evidenced by HR >90, WBC 17 31, and RLE cellulitis as above   · Lactic acid 2 7  · Receiving IV abx as above

## 2022-07-05 NOTE — ASSESSMENT & PLAN NOTE
· Patient states that pain in right lower extremity has improved at this time  · RLE with uniform erythematous rash, warm and tender to palpation without underlying fluctuance felt  · Meets SEPSIS criteria as below   · X-ray right lower extremity showing soft tissue swelling  · With no SOB, chest pain, hypoxia lower suspicion of PE/DVT  · Blood culture x2 pending  · ED gave IV cefepime  · Continue IV cefazolin  · trend WBC, follow up with pending infectious labs, prn pain control

## 2022-07-05 NOTE — ASSESSMENT & PLAN NOTE
· Baseline creatinine appears to be around 1  · Creatinine 1 52 currently  · Patient did receive Lasix today    If creatinine up trending will likely discontinue Lasix tomorrow  · Continue to monitor

## 2022-07-05 NOTE — ASSESSMENT & PLAN NOTE
Wt Readings from Last 3 Encounters:   07/04/22 125 kg (275 lb)     · Denies SOB, not hypoxic on RA; lower suspicion of exacerbation  · Continue home lasix and other home HTN medications  · Monitor for s/s of volume overload

## 2022-07-05 NOTE — PLAN OF CARE
Problem: PAIN - ADULT  Goal: Verbalizes/displays adequate comfort level or baseline comfort level  Description: Interventions:  - Encourage patient to monitor pain and request assistance  - Assess pain using appropriate pain scale  - Administer analgesics based on type and severity of pain and evaluate response  - Implement non-pharmacological measures as appropriate and evaluate response  - Consider cultural and social influences on pain and pain management  - Notify physician/advanced practitioner if interventions unsuccessful or patient reports new pain  Outcome: Progressing     Problem: INFECTION - ADULT  Goal: Absence or prevention of progression during hospitalization  Description: INTERVENTIONS:  - Assess and monitor for signs and symptoms of infection  - Monitor lab/diagnostic results  - Monitor all insertion sites, i e  indwelling lines, tubes, and drains  - Monitor endotracheal if appropriate and nasal secretions for changes in amount and color  - [place] appropriate cooling/warming therapies per order  - Administer medications as ordered  - Instruct and encourage patient and family to use good hand hygiene technique  - Identify and instruct in appropriate isolation precautions for identified infection/condition  Outcome: Progressing

## 2022-07-05 NOTE — H&P
[facility]  H&P- Adriana Westbrook 1945, 64 y o  male MRN: [id number]  Unit/Bed#: ED 12 Encounter: [id number]  Primary Care Provider: No primary care provider on file  Date and time admitted to hospital: 7/4/2022  7:37 PM    * Cellulitis of right lower extremity  Assessment & Plan  Reports development of painful red patch on his right calf this AM  This rash has now spread to the entire RLE  He went to urgent care who reportedly found he was febrile and requested he be evaluated in ED  Reports his home oxy dosing has helped with the pain  Denies other symptom or complaint at this time      /60 (BP Location: Left arm)   Pulse 99   Temp 98 5 °F (36 9 °C)   Resp 20   Ht 5' 11" (1 803 m)   Wt 125 kg (275 lb)   SpO2 98%   BMI 38 35 kg/m²     · Reporting RLE painful red patch on the calf this AM, which has spread to the entire lower extremity  · Reports going to urgent care who found he was febrile and suggested ED presentation  · RLE with uniform erythematous rash, warm and tender to palpation without underlying fluctuance felt  · Reports improvement in pain with home oxy  · Meets SEPSIS criteria as below   · XR RLE pending official read   · With no SOB, chest pain, hypoxia lower suspicion of PE/DVT  · ED gave IV cefepime  · Continue IV cefepime, trend WBC, follow up with pending infectious labs, prn pain control     Sepsis ([facility] )  Assessment & Plan  · As evidenced by HR >90, WBC 17 31, and RLE cellulitis as above   · Lactic acid 2 7  · Receiving IV abx as above    Hyponatremia  Assessment & Plan  · Sodium 132  · Possibly in the setting of home diuretic usage   · Trend BMP    T2DM (type 2 diabetes mellitus) ([facility] )  Assessment & Plan  · Hold home metformin  · Correctional SSI  · Hypoglycemia protocol  · Diabetic diet         CHF (congestive heart failure) ([facility])  Assessment & Plan  Wt Readings from Last 3 Encounters:   07/04/22 125 kg (275 lb)     · Denies SOB, not hypoxic on RA; lower suspicion of exacerbation  · Continue home lasix and other home HTN medications  · Monitor for s/s of volume overload     Rheumatoid arthritis ([facility] )  Assessment & Plan  · Continue home sulfasalazine     VTE Pharmacologic Prophylaxis: VTE Score: 6 High Risk (Score >/= 5) - Pharmacological DVT Prophylaxis Ordered: enoxaparin (Lovenox)  Sequential Compression Devices Ordered  Code Status: Level 1 - Full Code   Discussion with family: update in AM      Anticipated Length of Stay: Patient will be admitted on an inpatient basis with an anticipated length of stay of greater than 2 midnights secondary to RLE cellulitis   Total Time for Visit, including Counseling / Coordination of Care: 60 minutes Greater than 50% of this total time spent on direct patient counseling and coordination of care  Chief Complaint: RLE redness and pain    History of Present Illness:  Stefanie Porter is a 64 y o  male with a PMH of RA, T2DM, hypothyroidism, CHF, HTN, and HLP who presents with RLE redness and pain  Reports development of painful red patch on his right calf this AM  This rash has now spread to the entire RLE  He went to urgent care who reportedly found he was febrile and requested he be evaluated in ED  Reports his home oxy dosing has helped with the pain  Denies other symptom or complaint at this time  All questions answered at the bedside to the best of my ability  Review of Systems:  Review of Systems   Constitutional: Negative for activity change, appetite change, chills, diaphoresis, fatigue and fever  HENT: Negative for congestion, ear pain, nosebleeds and trouble swallowing  Eyes: Negative for pain and visual disturbance  Respiratory: Negative for apnea, cough, chest tightness, shortness of breath and wheezing  Cardiovascular: Negative for chest pain, palpitations and leg swelling     Gastrointestinal: Negative for abdominal distention, abdominal pain, blood in stool, constipation, diarrhea, nausea and vomiting  Endocrine: Negative for cold intolerance, heat intolerance and polyuria  Genitourinary: Negative for difficulty urinating, dysuria, flank pain and hematuria  Musculoskeletal: Negative for arthralgias, neck pain and neck stiffness  RLE tender rash     Skin: Positive for rash (red RLE rash)  Negative for color change and wound  Neurological: Negative for dizziness, tremors, syncope, weakness, light-headedness, numbness and headaches  Hematological: Negative for adenopathy  All other systems reviewed and are negative  Past Medical and Surgical History:   Past Medical History:   Diagnosis Date    Arthritis     Edema     Rheumatoid arthritis ([facility] )        No past surgical history on file  Meds/Allergies:  Prior to Admission medications    Medication Sig Start Date End Date Taking? Authorizing Provider   allopurinol (ZYLOPRIM) 100 mg tablet Take 100 mg by mouth daily   Yes Historical Provider, MD   atorvastatin (LIPITOR) 20 mg tablet Take 20 mg by mouth daily   Yes Historical Provider, MD   benazepril (LOTENSIN) 40 MG tablet Take 40 mg by mouth daily   Yes Historical Provider, MD   carvedilol (COREG) 12 5 mg tablet Take 12 5 mg by mouth 2 (two) times a day with meals   Yes Historical Provider, MD   furosemide (LASIX) 40 mg tablet Take 40 mg by mouth daily   Yes Historical Provider, MD   levothyroxine 125 mcg tablet Take 125 mcg by mouth daily   Yes Historical Provider, MD   metFORMIN (GLUCOPHAGE) 500 mg tablet Take 500 mg by mouth 2 (two) times a day with meals   Yes Historical Provider, MD   oxyCODONE (ROXICODONE) 15 mg immediate release tablet Take 10 mg by mouth every 6 (six) hours as needed for severe pain   Yes Historical Provider, MD   sulfaSALAzine (AZULFIDINE) 500 mg tablet Take 1,000 mg by mouth 2 (two) times a day   Yes Historical Provider, MD     I have reviewed home medications with patient personally  Allergies:    Allergies   Allergen Reactions    Levaquin [Levofloxacin] Rash       Social History:  Marital Status: /Civil Union   Occupation: N/A  Patient Pre-hospital Living Situation: Home  Patient Pre-hospital Level of Mobility: walks  Patient Pre-hospital Diet Restrictions: diabetic   Substance Use History:   Social History     Substance and Sexual Activity   Alcohol Use Not on file     Social History     Tobacco Use   Smoking Status Not on file   Smokeless Tobacco Not on file     Social History     Substance and Sexual Activity   Drug Use Not on file       Family History:  No family history on file  Physical Exam:     Vitals:   Blood Pressure: 120/57 (07/05/22 0001)  Pulse: 79 (07/05/22 0001)  Temperature: 98 5 °F (36 9 °C) (07/04/22 1931)  Respirations: 15 (07/05/22 0001)  Height: 5' 11" (180 3 cm) (07/04/22 1931)  Weight - Scale: 125 kg (275 lb) (07/04/22 1931)  SpO2: 97 % (07/05/22 0001)    Physical Exam  Vitals and nursing note reviewed  Constitutional:       General: He is not in acute distress  Appearance: Normal appearance  HENT:      Head: Normocephalic and atraumatic  Right Ear: External ear normal       Left Ear: External ear normal       Nose: Nose normal       Mouth/Throat:      Mouth: Mucous membranes are moist    Eyes:      Pupils: Pupils are equal, round, and reactive to light  Cardiovascular:      Rate and Rhythm: Normal rate and regular rhythm  Pulses: Normal pulses  Heart sounds: Normal heart sounds  No murmur heard  Pulmonary:      Effort: Pulmonary effort is normal  No respiratory distress  Breath sounds: Normal breath sounds  No wheezing or rales  Chest:      Chest wall: No tenderness  Abdominal:      General: Bowel sounds are normal  There is no distension  Palpations: Abdomen is soft  There is no mass  Tenderness: There is no abdominal tenderness  There is no guarding  Musculoskeletal:         General: Tenderness (RLE; no underlying fluctuance felt ) present  No swelling        Cervical back: Normal range of motion and neck supple  No rigidity or tenderness  Right lower leg: No edema  Left lower leg: No edema  Skin:     General: Skin is warm and dry  Capillary Refill: Capillary refill takes less than 2 seconds  Findings: Erythema (RLE) present  No lesion or rash  Neurological:      General: No focal deficit present  Mental Status: He is alert  Psychiatric:         Mood and Affect: Mood normal           Additional Data:     Lab Results:  Results from last 7 days   Lab Units 07/04/22 2042   WBC Thousand/uL 17 31*   HEMOGLOBIN g/dL 13 6   HEMATOCRIT % 42 1   PLATELETS Thousands/uL 186   BANDS PCT % 2   LYMPHO PCT % 6*   MONO PCT % 4   EOS PCT % 1     Results from last 7 days   Lab Units 07/04/22 2042   SODIUM mmol/L 131*   POTASSIUM mmol/L 4 8   CHLORIDE mmol/L 98*   CO2 mmol/L 25   BUN mg/dL 30*   CREATININE mg/dL 1 16   ANION GAP mmol/L 8   CALCIUM mg/dL 8 5   ALBUMIN g/dL 2 6*   TOTAL BILIRUBIN mg/dL 1 24*   ALK PHOS U/L 77   ALT U/L 28   AST U/L 18   GLUCOSE RANDOM mg/dL 136     Results from last 7 days   Lab Units 07/04/22  2042   INR  1 16             Results from last 7 days   Lab Units 07/04/22 2042   LACTIC ACID mmol/L 2 7*   PROCALCITONIN ng/ml 0 17       Imaging: Personally reviewed the following imaging: xray(s)  XR tibia fibula 2 views RIGHT    (Results Pending)       EKG and Other Studies Reviewed on Admission:   · EKG: pending EPIC upload   ** Please Note: This note has been constructed using a voice recognition system   **

## 2022-07-06 PROBLEM — L89.90 PRESSURE ULCER: Status: ACTIVE | Noted: 2022-07-06

## 2022-07-06 LAB
ANION GAP SERPL CALCULATED.3IONS-SCNC: 8 MMOL/L (ref 4–13)
ATRIAL RATE: 91 BPM
BASOPHILS # BLD AUTO: 0.01 THOUSANDS/ΜL (ref 0–0.1)
BASOPHILS NFR BLD AUTO: 0 % (ref 0–1)
BUN SERPL-MCNC: 31 MG/DL (ref 5–25)
CALCIUM SERPL-MCNC: 8 MG/DL (ref 8.3–10.1)
CHLORIDE SERPL-SCNC: 105 MMOL/L (ref 100–108)
CO2 SERPL-SCNC: 25 MMOL/L (ref 21–32)
CREAT SERPL-MCNC: 1.18 MG/DL (ref 0.6–1.3)
EOSINOPHIL # BLD AUTO: 0.35 THOUSAND/ΜL (ref 0–0.61)
EOSINOPHIL NFR BLD AUTO: 6 % (ref 0–6)
ERYTHROCYTE [DISTWIDTH] IN BLOOD BY AUTOMATED COUNT: 16.3 % (ref 11.6–15.1)
GFR SERPL CREATININE-BSD FRML MDRD: 58 ML/MIN/1.73SQ M
GLUCOSE SERPL-MCNC: 103 MG/DL (ref 65–140)
GLUCOSE SERPL-MCNC: 104 MG/DL (ref 65–140)
GLUCOSE SERPL-MCNC: 106 MG/DL (ref 65–140)
GLUCOSE SERPL-MCNC: 145 MG/DL (ref 65–140)
HCT VFR BLD AUTO: 32.7 % (ref 36.5–49.3)
HGB BLD-MCNC: 10.5 G/DL (ref 12–17)
IMM GRANULOCYTES # BLD AUTO: 0.04 THOUSAND/UL (ref 0–0.2)
IMM GRANULOCYTES NFR BLD AUTO: 1 % (ref 0–2)
LYMPHOCYTES # BLD AUTO: 0.65 THOUSANDS/ΜL (ref 0.6–4.47)
LYMPHOCYTES NFR BLD AUTO: 10 % (ref 14–44)
MCH RBC QN AUTO: 32.6 PG (ref 26.8–34.3)
MCHC RBC AUTO-ENTMCNC: 32.1 G/DL (ref 31.4–37.4)
MCV RBC AUTO: 102 FL (ref 82–98)
MONOCYTES # BLD AUTO: 0.36 THOUSAND/ΜL (ref 0.17–1.22)
MONOCYTES NFR BLD AUTO: 6 % (ref 4–12)
NEUTROPHILS # BLD AUTO: 4.89 THOUSANDS/ΜL (ref 1.85–7.62)
NEUTS SEG NFR BLD AUTO: 77 % (ref 43–75)
NRBC BLD AUTO-RTO: 0 /100 WBCS
P AXIS: 54 DEGREES
PLATELET # BLD AUTO: 269 THOUSANDS/UL (ref 149–390)
PMV BLD AUTO: 8.9 FL (ref 8.9–12.7)
POTASSIUM SERPL-SCNC: 4.3 MMOL/L (ref 3.5–5.3)
PR INTERVAL: 154 MS
QRS AXIS: 59 DEGREES
QRSD INTERVAL: 128 MS
QT INTERVAL: 376 MS
QTC INTERVAL: 462 MS
RBC # BLD AUTO: 3.22 MILLION/UL (ref 3.88–5.62)
SODIUM SERPL-SCNC: 138 MMOL/L (ref 136–145)
T WAVE AXIS: 32 DEGREES
VENTRICULAR RATE: 91 BPM
WBC # BLD AUTO: 6.3 THOUSAND/UL (ref 4.31–10.16)

## 2022-07-06 PROCEDURE — 85025 COMPLETE CBC W/AUTO DIFF WBC: CPT | Performed by: INTERNAL MEDICINE

## 2022-07-06 PROCEDURE — 82948 REAGENT STRIP/BLOOD GLUCOSE: CPT

## 2022-07-06 PROCEDURE — 93010 ELECTROCARDIOGRAM REPORT: CPT | Performed by: INTERNAL MEDICINE

## 2022-07-06 PROCEDURE — 99232 SBSQ HOSP IP/OBS MODERATE 35: CPT | Performed by: INTERNAL MEDICINE

## 2022-07-06 PROCEDURE — 36415 COLL VENOUS BLD VENIPUNCTURE: CPT | Performed by: INTERNAL MEDICINE

## 2022-07-06 PROCEDURE — 80048 BASIC METABOLIC PNL TOTAL CA: CPT | Performed by: INTERNAL MEDICINE

## 2022-07-06 RX ADMIN — HEPARIN SODIUM 5000 UNITS: 5000 INJECTION INTRAVENOUS; SUBCUTANEOUS at 05:23

## 2022-07-06 RX ADMIN — SULFASALAZINE 1000 MG: 500 TABLET ORAL at 17:35

## 2022-07-06 RX ADMIN — HEPARIN SODIUM 5000 UNITS: 5000 INJECTION INTRAVENOUS; SUBCUTANEOUS at 13:46

## 2022-07-06 RX ADMIN — CEFAZOLIN SODIUM 2000 MG: 2 SOLUTION INTRAVENOUS at 00:04

## 2022-07-06 RX ADMIN — CARVEDILOL 12.5 MG: 12.5 TABLET, FILM COATED ORAL at 17:24

## 2022-07-06 RX ADMIN — HEPARIN SODIUM 5000 UNITS: 5000 INJECTION INTRAVENOUS; SUBCUTANEOUS at 22:05

## 2022-07-06 RX ADMIN — ALLOPURINOL 100 MG: 100 TABLET ORAL at 08:11

## 2022-07-06 RX ADMIN — LEVOTHYROXINE SODIUM 125 MCG: 125 TABLET ORAL at 08:07

## 2022-07-06 RX ADMIN — SULFASALAZINE 1000 MG: 500 TABLET ORAL at 08:11

## 2022-07-06 RX ADMIN — SODIUM CHLORIDE 500 ML: 0.9 INJECTION, SOLUTION INTRAVENOUS at 08:45

## 2022-07-06 RX ADMIN — CEFAZOLIN SODIUM 2000 MG: 2 SOLUTION INTRAVENOUS at 08:12

## 2022-07-06 RX ADMIN — CEFAZOLIN SODIUM 2000 MG: 2 SOLUTION INTRAVENOUS at 17:14

## 2022-07-06 NOTE — ED NOTES
Pt adjusted to the side of the bed to eat breakfast  Pt states he is feeling better and asked for toast and coffee to be added to his breakfast  Kitchen called to place order      Khurram Busch RN  07/06/22 0966

## 2022-07-06 NOTE — ASSESSMENT & PLAN NOTE
· Right lower extremity swelling and erythema improving  · Meets SEPSIS criteria as below   · X-ray right lower extremity showing soft tissue swelling  · Blood culture with no growth at 1 day  · Continue IV cefazolin  · trend WBC, follow up with pending infectious labs, prn pain control

## 2022-07-06 NOTE — ED NOTES
Patient legs elevated and turned to left side, pulled up in bed   Advised to not take off b/p cuff and pulse ox     Petra Ledezma RN  07/06/22 7019

## 2022-07-06 NOTE — ASSESSMENT & PLAN NOTE
· Hold home metformin  · Correctional SSI  · Hypoglycemia protocol  · Diabetic diet   (P) [phone] [phone]

## 2022-07-06 NOTE — ASSESSMENT & PLAN NOTE
· Right lower extremity swelling and erythema improving  · Meets SEPSIS criteria as below   · X-ray right lower extremity showing soft tissue swelling  · Blood culture with no growth at 2 day  · Continue IV cefazolin  · trend WBC, follow up with pending infectious labs, prn pain control

## 2022-07-06 NOTE — PROGRESS NOTES
[facility]  Progress Note - Darcy Sake 1945, 64 y o  male MRN: [id number]  Unit/Bed#: ED 12 Encounter: [id number]  Primary Care Provider: No primary care provider on file  Date and time admitted to hospital: 7/4/2022  7:37 PM    * Cellulitis of right lower extremity  Assessment & Plan  · Right lower extremity swelling and erythema improving  · Meets SEPSIS criteria as below   · X-ray right lower extremity showing soft tissue swelling  · Blood culture with no growth at 1 day  · Continue IV cefazolin  · trend WBC, follow up with pending infectious labs, prn pain control     Acute kidney injury ([facility] )  Assessment & Plan  · Baseline creatinine appears to be around 1  · Creatinine back to baseline  · Continue to monitor    CHF (congestive heart failure) ([facility] )  Assessment & Plan  Wt Readings from Last 3 Encounters:   07/04/22 125 kg (275 lb)     · Patient denies any shortness of breath on examination  · Lower extremities are swollen but patient states this is his baseline  · Continue home lasix   · Continue to monitor    Sepsis ([facility] )  Assessment & Plan  · As evidenced by HR >90, WBC 17 31, and RLE cellulitis as above   · Plan as above  · Receiving IV abx as above    Hyponatremia  Assessment & Plan  · Resolved    T2DM (type 2 diabetes mellitus) ([facility] )  Assessment & Plan  · Hold home metformin  · Correctional SSI  · Hypoglycemia protocol  · Diabetic diet   (P) [phone]      Rheumatoid arthritis (HCC)  Assessment & Plan  · Continue home sulfasalazine         VTE Pharmacologic Prophylaxis: VTE Score: 6 Moderate Risk (Score 3-4) - Pharmacological DVT Prophylaxis Ordered: heparin  Patient Centered Rounds: I performed bedside rounds with nursing staff today  Discussions with Specialists or Other Care Team Provider:  Case management    Education and Discussions with Family / Patient: Patient declined call to   Time Spent for Care: 30 minutes   More than 50% of total time spent on counseling and coordination of care as described above  Current Length of Stay: 2 day(s)  Current Patient Status: Inpatient   Certification Statement: The patient will continue to require additional inpatient hospital stay due to Cellulitis  Discharge Plan: Anticipate discharge in 24-48 hrs to home  Code Status: Level 1 - Full Code    Subjective:   Patient resting comfortably on examination  Patient states that his lower extremity swelling has improved today and yesterday pain as a burning  Patient had no other of complaints on exam     Objective:     Vitals:   Temp (24hrs), Av °F (36 7 °C), Min:97 4 °F (36 3 °C), Max:98 6 °F (37 °C)    Temp:  [97 4 °F (36 3 °C)-98 6 °F (37 °C)] 98 6 °F (37 °C)  HR:  [65-77] 75  Resp:  [18-20] 20  BP: ()/(42-59) 122/59  SpO2:  [90 %-97 %] 96 %  Body mass index is 38 35 kg/m²  Input and Output Summary (last 24 hours): Intake/Output Summary (Last 24 hours) at 2022 1200  Last data filed at 2022 1118  Gross per 24 hour   Intake 1270 ml   Output 500 ml   Net 770 ml       Physical Exam:   Physical Exam  Vitals and nursing note reviewed  Constitutional:       General: He is not in acute distress  Appearance: He is well-developed  HENT:      Head: Normocephalic and atraumatic  Eyes:      General: No scleral icterus  Conjunctiva/sclera: Conjunctivae normal    Cardiovascular:      Rate and Rhythm: Normal rate and regular rhythm  Heart sounds: Normal heart sounds  No murmur heard  No friction rub  No gallop  Pulmonary:      Effort: Pulmonary effort is normal  No respiratory distress  Breath sounds: Normal breath sounds  No wheezing or rales  Abdominal:      General: Bowel sounds are normal  There is no distension  Palpations: Abdomen is soft  Tenderness: There is no abdominal tenderness  Musculoskeletal:         General: Normal range of motion        Comments: Right lower extremity swelling improved compared to yesterday  Minimal tenderness to palpation   Skin:     General: Skin is warm  Findings: No rash  Neurological:      Mental Status: He is alert and oriented to person, place, and time  Additional Data:     Labs:  Results from last 7 days   Lab Units 07/06/22  0449 07/05/22  0506 07/04/22  2042   WBC Thousand/uL 6 30   < > 17 31*   HEMOGLOBIN g/dL 10 5*   < > 13 6   HEMATOCRIT % 32 7*   < > 42 1   PLATELETS Thousands/uL 269   < > 186   BANDS PCT %  --   --  2   NEUTROS PCT % 77*   < >  --    LYMPHS PCT % 10*   < >  --    LYMPHO PCT %  --   --  6*   MONOS PCT % 6   < >  --    MONO PCT %  --   --  4   EOS PCT % 6   < > 1    < > = values in this interval not displayed  Results from last 7 days   Lab Units 07/06/22 0449 07/05/22  0941 07/04/22  2042   SODIUM mmol/L 138   < > 131*   POTASSIUM mmol/L 4 3   < > 4 8   CHLORIDE mmol/L 105   < > 98*   CO2 mmol/L 25   < > 25   BUN mg/dL 31*   < > 30*   CREATININE mg/dL 1 18   < > 1 16   ANION GAP mmol/L 8   < > 8   CALCIUM mg/dL 8 0*   < > 8 5   ALBUMIN g/dL  --   --  2 6*   TOTAL BILIRUBIN mg/dL  --   --  1 24*   ALK PHOS U/L  --   --  77   ALT U/L  --   --  28   AST U/L  --   --  18   GLUCOSE RANDOM mg/dL 103   < > 136    < > = values in this interval not displayed  Results from last 7 days   Lab Units 07/04/22  2042   INR  1 16     Results from last 7 days   Lab Units 07/06/22  1128 07/05/22  2119 07/05/22  1616 07/05/22  1051 07/05/22  0657 07/04/22  2350   POC GLUCOSE mg/dl 104 120 131 138 110 149*         Results from last 7 days   Lab Units 07/04/22  2359 07/04/22  2042   LACTIC ACID mmol/L 2 1* 2 7*   PROCALCITONIN ng/ml  --  0 17       Lines/Drains:  Invasive Devices  Report    Peripheral Intravenous Line  Duration           Peripheral IV 07/04/22 Right Hand 1 day    Peripheral IV 07/06/22 Left Wrist <1 day                      Imaging: No pertinent imaging reviewed      Recent Cultures (last 7 days):   Results from last 7 days   Lab Units 07/04/22 2048 07/04/22 2042   BLOOD CULTURE  No Growth at 24 hrs  No Growth at 24 hrs  Last 24 Hours Medication List:   Current Facility-Administered Medications   Medication Dose Route Frequency Provider Last Rate    allopurinol  100 mg Oral Daily [place], [place]      atorvastatin  20 mg Oral Daily [place], [place]      carvedilol  12 5 mg Oral BID With [facility]als [facility] [place]      cefazolin  2,000 mg Intravenous Q8H Fredy [facility] [place] Stopped (07/06/22 1003)    [facility]  [facility]      heparin (porcine)  5,000 Units Subcutaneous [facility] Sunni Alatorre DO      insulin lispro  1-6 Units Subcutaneous HS [place], [place]      insulin lispro  2-12 Units Subcutaneous TID AC [place], [place]      levothyroxine  125 mcg Oral Daily [place], [place]      nystatin   Topical BID Sunni Alatorre DO      ondansetron  4 mg Intravenous Q8H PRN Sunni Alatorre DO      oxyCODONE  10 mg Oral Q6H PRN [place], [place]      sulfaSALAzine  1,000 mg Oral BID Fredy Malik PA-C          Today, Patient Was Seen By: Sunni Alatorre DO    **Please Note: This note may have been constructed using a voice recognition system  **

## 2022-07-06 NOTE — ED NOTES
This writer went in to assess pt due to low BP readings  Pt found sleeping with audible snoring  Pt woken up and readjusted in bed to retake BP which was 107/61  Pt voiced his frustration with the uncomfortable stretcher and still being in the ER   This writer apologized and asked if there was anyway we could assist him with comfort at this time which he decline stating "I'll just wait for the doctors to get here because I have an earful to tell them "      Avni Harris, ISAI  07/06/22 7735

## 2022-07-06 NOTE — ED NOTES
Patient remains on stretcher with wife at bedside, no distress noted     Kirstin Scott RN  07/06/22 [id number]

## 2022-07-06 NOTE — ASSESSMENT & PLAN NOTE
Wt Readings from Last 3 Encounters:   07/04/22 125 kg (275 lb)     · Patient denies any shortness of breath on examination  · Lower extremities are swollen but patient states this is his baseline  · Continue home lasix   · Continue to monitor

## 2022-07-06 NOTE — ED NOTES
Patient noted with arm bent that is taking b/p, unfolded arm and took another b/p     Leydi Wren RN  07/05/22 2041

## 2022-07-06 NOTE — ASSESSMENT & PLAN NOTE
· Hold home metformin  · [employer]  · Hypoglycemia protocol  · Diabetic diet   (P) [phone]

## 2022-07-07 LAB
ANION GAP SERPL CALCULATED.3IONS-SCNC: 8 MMOL/L (ref 4–13)
BASOPHILS # BLD AUTO: 0.02 THOUSANDS/ΜL (ref 0–0.1)
BASOPHILS NFR BLD AUTO: 1 % (ref 0–1)
BUN SERPL-MCNC: 19 MG/DL (ref 5–25)
CALCIUM SERPL-MCNC: 8.2 MG/DL (ref 8.3–10.1)
CHLORIDE SERPL-SCNC: 109 MMOL/L (ref 100–108)
CO2 SERPL-SCNC: 26 MMOL/L (ref 21–32)
CREAT SERPL-MCNC: 0.92 MG/DL (ref 0.6–1.3)
EOSINOPHIL # BLD AUTO: 0.47 THOUSAND/ΜL (ref 0–0.61)
EOSINOPHIL NFR BLD AUTO: 13 % (ref 0–6)
ERYTHROCYTE [DISTWIDTH] IN BLOOD BY AUTOMATED COUNT: 16.3 % (ref 11.6–15.1)
GFR SERPL CREATININE-BSD FRML MDRD: 79 ML/MIN/1.73SQ M
GLUCOSE SERPL-MCNC: 110 MG/DL (ref 65–140)
GLUCOSE SERPL-MCNC: 117 MG/DL (ref 65–140)
GLUCOSE SERPL-MCNC: 121 MG/DL (ref 65–140)
GLUCOSE SERPL-MCNC: 123 MG/DL (ref 65–140)
GLUCOSE SERPL-MCNC: 132 MG/DL (ref 65–140)
HCT VFR BLD AUTO: 32.2 % (ref 36.5–49.3)
HGB BLD-MCNC: 10.3 G/DL (ref 12–17)
IMM GRANULOCYTES # BLD AUTO: 0.02 THOUSAND/UL (ref 0–0.2)
IMM GRANULOCYTES NFR BLD AUTO: 1 % (ref 0–2)
LYMPHOCYTES # BLD AUTO: 0.51 THOUSANDS/ΜL (ref 0.6–4.47)
LYMPHOCYTES NFR BLD AUTO: 14 % (ref 14–44)
MCH RBC QN AUTO: 32.4 PG (ref 26.8–34.3)
MCHC RBC AUTO-ENTMCNC: 32 G/DL (ref 31.4–37.4)
MCV RBC AUTO: 101 FL (ref 82–98)
MONOCYTES # BLD AUTO: 0.28 THOUSAND/ΜL (ref 0.17–1.22)
MONOCYTES NFR BLD AUTO: 8 % (ref 4–12)
NEUTROPHILS # BLD AUTO: 2.43 THOUSANDS/ΜL (ref 1.85–7.62)
NEUTS SEG NFR BLD AUTO: 63 % (ref 43–75)
NRBC BLD AUTO-RTO: 0 /100 WBCS
PLATELET # BLD AUTO: 248 THOUSANDS/UL (ref 149–390)
PMV BLD AUTO: 8.9 FL (ref 8.9–12.7)
POTASSIUM SERPL-SCNC: 4.1 MMOL/L (ref 3.5–5.3)
RBC # BLD AUTO: 3.18 MILLION/UL (ref 3.88–5.62)
SODIUM SERPL-SCNC: 143 MMOL/L (ref 136–145)
WBC # BLD AUTO: 3.73 THOUSAND/UL (ref 4.31–10.16)

## 2022-07-07 PROCEDURE — 99232 SBSQ HOSP IP/OBS MODERATE 35: CPT | Performed by: INTERNAL MEDICINE

## 2022-07-07 PROCEDURE — 80048 BASIC METABOLIC PNL TOTAL CA: CPT | Performed by: INTERNAL MEDICINE

## 2022-07-07 PROCEDURE — 82948 REAGENT STRIP/BLOOD GLUCOSE: CPT

## 2022-07-07 PROCEDURE — 85025 COMPLETE CBC W/AUTO DIFF WBC: CPT | Performed by: INTERNAL MEDICINE

## 2022-07-07 RX ADMIN — FUROSEMIDE 40 MG: 40 TABLET ORAL at 08:27

## 2022-07-07 RX ADMIN — HEPARIN SODIUM 5000 UNITS: 5000 INJECTION INTRAVENOUS; SUBCUTANEOUS at 05:21

## 2022-07-07 RX ADMIN — CEFAZOLIN SODIUM 2000 MG: 2 SOLUTION INTRAVENOUS at 15:21

## 2022-07-07 RX ADMIN — CARVEDILOL 12.5 MG: 12.5 TABLET, FILM COATED ORAL at 08:27

## 2022-07-07 RX ADMIN — CEFAZOLIN SODIUM 2000 MG: 2 SOLUTION INTRAVENOUS at 00:00

## 2022-07-07 RX ADMIN — ATORVASTATIN CALCIUM 20 MG: 20 TABLET, FILM COATED ORAL at 08:27

## 2022-07-07 RX ADMIN — CEFAZOLIN SODIUM 2000 MG: 2 SOLUTION INTRAVENOUS at 08:29

## 2022-07-07 RX ADMIN — OXYCODONE HYDROCHLORIDE 10 MG: 10 TABLET ORAL at 05:31

## 2022-07-07 RX ADMIN — SULFASALAZINE 1000 MG: 500 TABLET ORAL at 17:06

## 2022-07-07 RX ADMIN — HEPARIN SODIUM 5000 UNITS: 5000 INJECTION INTRAVENOUS; SUBCUTANEOUS at 15:20

## 2022-07-07 RX ADMIN — NYSTATIN 1 APPLICATION: 100000 POWDER TOPICAL at 11:11

## 2022-07-07 RX ADMIN — HEPARIN SODIUM 5000 UNITS: 5000 INJECTION INTRAVENOUS; SUBCUTANEOUS at 21:32

## 2022-07-07 RX ADMIN — LEVOTHYROXINE SODIUM 125 MCG: 125 TABLET ORAL at 05:21

## 2022-07-07 RX ADMIN — CEFAZOLIN SODIUM 2000 MG: 2 SOLUTION INTRAVENOUS at 23:38

## 2022-07-07 RX ADMIN — ALLOPURINOL 100 MG: 100 TABLET ORAL at 08:27

## 2022-07-07 RX ADMIN — CARVEDILOL 12.5 MG: 12.5 TABLET, FILM COATED ORAL at 17:06

## 2022-07-07 RX ADMIN — SULFASALAZINE 1000 MG: 500 TABLET ORAL at 08:27

## 2022-07-07 NOTE — PLAN OF CARE
Problem: MOBILITY - ADULT  Goal: Maintain or return to baseline ADL function  Description: INTERVENTIONS:  -  Assess patient's ability to carry out ADLs; assess patient's baseline for ADL function and identify physical deficits which impact ability to perform ADLs (bathing, care of mouth/teeth, toileting, grooming, dressing, etc )  - Assess/evaluate cause of self-care deficits   - Assess range of motion  - Assess patient's mobility; develop plan if impaired  - Assess patient's need for assistive devices and provide as appropriate  - Encourage maximum independence but intervene and supervise when necessary  - Involve family in performance of ADLs  - Assess for home care needs following discharge   - Consider OT consult to assist with ADL evaluation and planning for discharge  - Provide patient education as appropriate  Outcome: Progressing  Goal: Maintains/Returns to pre admission functional level  Description: INTERVENTIONS:  - Perform BMAT or MOVE assessment daily    - Set and communicate daily mobility goal to care team and patient/family/caregiver  - Collaborate with rehabilitation services on mobility goals if consulted  - Perform Range of Motion 2 times a day  - Reposition patient every 2 hours    - Dangle patient 2 times a day  - Stand patient 2 times a day  - Ambulate patient 2 times a day  - Out of bed to chair 2 times a day   - Out of bed for meals 2 times a day  - Out of bed for toileting  - Record patient progress and toleration of activity level   Outcome: Progressing     Problem: PAIN - ADULT  Goal: Verbalizes/displays adequate comfort level or baseline comfort level  Description: Interventions:  - Encourage patient to monitor pain and request assistance  - Assess pain using appropriate pain scale  - Administer analgesics based on type and severity of pain and evaluate response  - Implement non-pharmacological measures as appropriate and evaluate response  - Consider cultural and social influences on pain and pain management  - Notify physician/advanced practitioner if interventions unsuccessful or patient reports new pain  Outcome: Progressing     Problem: INFECTION - ADULT  Goal: Absence or prevention of progression during hospitalization  Description: INTERVENTIONS:  - Assess and monitor for signs and symptoms of infection  - Monitor lab/diagnostic results  - Monitor all insertion sites, i e  indwelling lines, tubes, and drains  - Monitor endotracheal if appropriate and nasal secretions for changes in amount and color  - [place] appropriate cooling/warming therapies per order  - Administer medications as ordered  - Instruct and encourage patient and family to use good hand hygiene technique  - Identify and instruct in appropriate isolation precautions for identified infection/condition  Outcome: Progressing     Problem: SAFETY ADULT  Goal: Patient will remain free of falls  Description: INTERVENTIONS:  - Educate patient/family on patient safety including physical limitations  - Instruct patient to call for assistance with activity   - Consult OT/PT to assist with strengthening/mobility   - Keep Call bell within reach  - Keep bed low and locked with side rails adjusted as appropriate  - Keep care items and personal belongings within reach  - Initiate and maintain comfort rounds  - Make Fall Risk Sign visible to staff  - Offer Toileting every 2 Hours, in advance of need  - Initiate/Maintain alarm  - Obtain necessary fall risk management equipment:   - Apply yellow socks and bracelet for high fall risk patients  - Consider moving patient to room near nurses station  Outcome: Progressing     Problem: DISCHARGE PLANNING  Goal: Discharge to home or other facility with appropriate resources  Description: INTERVENTIONS:  - Identify barriers to discharge w/patient and caregiver  - Arrange for needed discharge resources and transportation as appropriate  - Identify discharge learning needs (meds, wound care, etc )  - Arrange for interpretive services to assist at discharge as needed  - Refer to Case Management Department for coordinating discharge planning if the patient needs post-hospital services based on physician/advanced practitioner order or complex needs related to functional status, cognitive ability, or social support system  Outcome: Progressing     Problem: Knowledge Deficit  Goal: Patient/family/caregiver demonstrates understanding of disease process, treatment plan, medications, and discharge instructions  Description: Complete learning assessment and assess knowledge base    Interventions:  - Provide teaching at level of understanding  - Provide teaching via preferred learning methods  Outcome: Progressing     Problem: Prexisting or High Potential for Compromised Skin Integrity  Goal: Skin integrity is maintained or improved  Description: INTERVENTIONS:  - Identify patients at risk for skin breakdown  - Assess and monitor skin integrity  - Assess and monitor nutrition and hydration status  - Monitor labs   - Assess for incontinence   - Turn and reposition patient  - Assist with mobility/ambulation  - Relieve pressure over bony prominences  - Avoid friction and shearing  - Provide appropriate hygiene as needed including keeping skin clean and dry  - Evaluate need for skin moisturizer/barrier cream  - Collaborate with interdisciplinary team   - Patient/family teaching  - Consider wound care consult   Outcome: Progressing     Problem: Potential for Falls  Goal: Patient will remain free of falls  Description: INTERVENTIONS:  - Educate patient/family on patient safety including physical limitations  - Instruct patient to call for assistance with activity   - Consult OT/PT to assist with strengthening/mobility   - Keep Call bell within reach  - Keep bed low and locked with side rails adjusted as appropriate  - Keep care items and personal belongings within reach  - Initiate and maintain comfort rounds  - Make Fall Risk Sign visible to staff  - Offer Toileting every 2 Hours, in advance of need  - Initiate/Maintain alarm  - Obtain necessary fall risk management equipment:   - Apply yellow socks and bracelet for high fall risk patients  - Consider moving patient to room near nurses station  Outcome: Progressing     Problem: NEUROSENSORY - ADULT  Goal: Achieves stable or improved neurological status  Description: INTERVENTIONS  - Monitor and report changes in neurological status  - Monitor vital signs such as temperature, blood pressure, glucose, and any other labs ordered   - Initiate measures to prevent increased intracranial pressure  - Monitor for seizure activity and implement precautions if appropriate      Outcome: Progressing  Goal: Remains free of injury related to seizures activity  Description: INTERVENTIONS  - Maintain airway, patient safety  and administer oxygen as ordered  - Monitor patient for seizure activity, document and report duration and description of seizure to physician/advanced practitioner  - If seizure occurs,  ensure patient safety during seizure  - Reorient patient post seizure  - Seizure pads on all 4 side rails  - Instruct patient/family to notify RN of any seizure activity including if an aura is experienced  - Instruct patient/family to call for assistance with activity based on nursing assessment  - Administer anti-seizure medications if ordered    Outcome: Progressing  Goal: Achieves maximal functionality and self care  Description: INTERVENTIONS  - Monitor swallowing and airway patency with patient fatigue and changes in neurological status  - Encourage and assist patient to increase activity and self care     - Encourage visually impaired, hearing impaired and aphasic patients to use assistive/communication devices  Outcome: Progressing     Problem: CARDIOVASCULAR - ADULT  Goal: Maintains optimal cardiac output and hemodynamic stability  Description: INTERVENTIONS:  - Monitor I/O, vital signs and rhythm  - Monitor for S/S and trends of decreased cardiac output  - Administer and titrate ordered vasoactive medications to optimize hemodynamic stability  - Assess quality of pulses, skin color and temperature  - Assess for signs of decreased coronary artery perfusion  - Instruct patient to report change in severity of symptoms  Outcome: Progressing  Goal: Absence of cardiac dysrhythmias or at baseline rhythm  Description: INTERVENTIONS:  - Continuous cardiac monitoring, vital signs, obtain 12 lead EKG if ordered  - Administer antiarrhythmic and heart rate control medications as ordered  - Monitor electrolytes and administer replacement therapy as ordered  Outcome: Progressing     Problem: RESPIRATORY - ADULT  Goal: Achieves optimal ventilation and oxygenation  Description: INTERVENTIONS:  - Assess for changes in respiratory status  - Assess for changes in mentation and behavior  - Position to facilitate oxygenation and minimize respiratory effort  - Oxygen administered by appropriate delivery if ordered  - Initiate smoking cessation education as indicated  - Encourage broncho-pulmonary hygiene including cough, deep breathe, Incentive Spirometry  - Assess the need for suctioning and aspirate as needed  - Assess and instruct to report SOB or any respiratory difficulty  - Respiratory Therapy support as indicated  Outcome: Progressing     Problem: GASTROINTESTINAL - ADULT  Goal: Minimal or absence of nausea and/or vomiting  Description: INTERVENTIONS:  - Administer IV fluids if ordered to ensure adequate hydration  - Maintain NPO status until nausea and vomiting are resolved  - Nasogastric tube if ordered  - Administer ordered antiemetic medications as needed  - Provide nonpharmacologic comfort measures as appropriate  - Advance diet as tolerated, if ordered  - Consider nutrition services referral to assist patient with adequate nutrition and appropriate food choices  Outcome: Progressing  Goal: Maintains or returns to baseline bowel function  Description: INTERVENTIONS:  - Assess bowel function  - Encourage oral fluids to ensure adequate hydration  - Administer IV fluids if ordered to ensure adequate hydration  - Administer ordered medications as needed  - Encourage mobilization and activity  - Consider nutritional services referral to assist patient with adequate nutrition and appropriate food choices  Outcome: Progressing  Goal: Maintains adequate nutritional intake  Description: INTERVENTIONS:  - Monitor percentage of each meal consumed  - Identify factors contributing to decreased intake, treat as appropriate  - Assist with meals as needed  - Monitor I&O, weight, and lab values if indicated  - Obtain nutrition services referral as needed  Outcome: Progressing  Goal: Establish and maintain optimal ostomy function  Description: INTERVENTIONS:  - Assess bowel function  - Encourage oral fluids to ensure adequate hydration  - Administer IV fluids if ordered to ensure adequate hydration   - Administer ordered medications as needed  - Encourage mobilization and activity  - Nutrition services referral to assist patient with appropriate food choices  - Assess stoma site  - Consider wound care consult   Outcome: Progressing  Goal: Oral mucous membranes remain intact  Description: INTERVENTIONS  - Assess oral mucosa and hygiene practices  - Implement preventative oral hygiene regimen  - Implement oral medicated treatments as ordered  - Initiate Nutrition services referral as needed  Outcome: Progressing     Problem: GENITOURINARY - ADULT  Goal: Maintains or returns to baseline urinary function  Description: INTERVENTIONS:  - Assess urinary function  - Encourage oral fluids to ensure adequate hydration if ordered  - Administer IV fluids as ordered to ensure adequate hydration  - Administer ordered medications as needed  - Offer frequent toileting  - Follow urinary retention protocol if ordered  Outcome: Progressing  Goal: Absence of urinary retention  Description: INTERVENTIONS:  - Assess patients ability to void and empty bladder  - Monitor I/O  - Bladder scan as needed  - Discuss with physician/AP medications to alleviate retention as needed  - Discuss catheterization for long term situations as appropriate  Outcome: Progressing  Goal: Urinary catheter remains patent  Description: INTERVENTIONS:  - Assess patency of urinary catheter  - If patient has a chronic reyes, consider changing catheter if non-functioning  - Follow guidelines for intermittent irrigation of non-functioning urinary catheter  Outcome: Progressing     Problem: METABOLIC, FLUID AND ELECTROLYTES - ADULT  Goal: Electrolytes maintained within normal limits  Description: INTERVENTIONS:  - Monitor labs and assess patient for signs and symptoms of electrolyte imbalances  - Administer electrolyte replacement as ordered  - Monitor response to electrolyte replacements, including repeat lab results as appropriate  - Instruct patient on fluid and nutrition as appropriate  Outcome: Progressing  Goal: Fluid balance maintained  Description: INTERVENTIONS:  - Monitor labs   - Monitor I/O and WT  - Instruct patient on fluid and nutrition as appropriate  - Assess for signs & symptoms of volume excess or deficit  Outcome: Progressing  Goal: Glucose maintained within target range  Description: INTERVENTIONS:  - Monitor Blood Glucose as ordered  - Assess for signs and symptoms of hyperglycemia and hypoglycemia  - Administer ordered medications to maintain glucose within target range  - Assess nutritional intake and initiate nutrition service referral as needed  Outcome: Progressing     Problem: SKIN/TISSUE INTEGRITY - ADULT  Goal: Skin Integrity remains intact(Skin Breakdown Prevention)  Description: Assess:  -Perform Parker assessment every shift and PRN   -Clean and moisturize skin every 2 hours or PRN  -Inspect skin when repositioning, toileting, and assisting with ADLS  -Assess under medical devices  -Assess extremities for adequate circulation and sensation     Bed Management:  -Have minimal linens on bed & keep smooth, unwrinkled  -Change linens as needed when moist or perspiring  -Avoid sitting or lying in one position for more than 2 hours while in bed  -Keep HOB at 30 degrees     Toileting:  -Offer bedside commode  -Assess for incontinence every 2 hours or PRN  -Use incontinent care products after each incontinent episode     Activity:  -Mobilize patient 2 times a day  -Encourage activity and walks on unit  -Encourage or provide ROM exercises   -Turn and reposition patient every 2 Hours  -Use appropriate equipment to lift or move patient in bed  -Instruct/ Assist with weight shifting every 2 hours when out of bed in chair  -Consider limitation of chair time 2 hour intervals    Skin Care:  -Avoid use of baby powder, tape, friction and shearing, hot water or constrictive clothing  -Relieve pressure over bony prominences using foam wedge  -Do not massage red bony areas    Next Steps:  -Teach patient strategies to minimize risks    -Consider consults to  interdisciplinary teams   Outcome: Progressing  Goal: Incision(s), wounds(s) or drain site(s) healing without S/S of infection  Description: INTERVENTIONS  - Assess and document dressing, incision, wound bed, drain sites and surrounding tissue  - Provide patient and family education  - Perform skin care/dressing changes every shift or PRN  Outcome: Progressing  Goal: Pressure injury heals and does not worsen  Description: Interventions:  - Implement low air loss mattress or specialty surface (Criteria met)  - Apply silicone foam dressing  - Instruct/assist with weight shifting every 30  minutes when in chair   - Limit chair time to 2 hour intervals  - Use special pressure reducing interventions when in chair   - Apply fecal or urinary incontinence containment device   - Perform passive or active ROM   - Turn and reposition patient & offload bony prominences every 2 hours   - Utilize friction reducing device or surface for transfers   - Consider consults to  interdisciplinary teams   - Use incontinent care products after each incontinent episodes  - Consider nutrition services referral as needed  Outcome: Progressing     Problem: HEMATOLOGIC - ADULT  Goal: Maintains hematologic stability  Description: INTERVENTIONS  - Assess for signs and symptoms of bleeding or hemorrhage  - Monitor labs  - Administer supportive blood products/factors as ordered and appropriate  Outcome: Progressing     Problem: MUSCULOSKELETAL - ADULT  Goal: Maintain or return mobility to safest level of function  Description: INTERVENTIONS:  - Assess patient's ability to carry out ADLs; assess patient's baseline for ADL function and identify physical deficits which impact ability to perform ADLs (bathing, care of mouth/teeth, toileting, grooming, dressing, etc )  - Assess/evaluate cause of self-care deficits   - Assess range of motion  - Assess patient's mobility  - Assess patient's need for assistive devices and provide as appropriate  - Encourage maximum independence but intervene and supervise when necessary  - Involve family in performance of ADLs  - Assess for home care needs following discharge   - Consider OT consult to assist with ADL evaluation and planning for discharge  - Provide patient education as appropriate  Outcome: Progressing  Goal: Maintain proper alignment of affected body part  Description: INTERVENTIONS:  - Support, maintain and protect limb and body alignment  - Provide patient/ family with appropriate education  Outcome: Progressing     Problem: Nutrition/Hydration-ADULT  Goal: Nutrient/Hydration intake appropriate for improving, restoring or maintaining nutritional needs  Description: Monitor and assess patient's nutrition/hydration status for malnutrition  Collaborate with interdisciplinary team and initiate plan and interventions as ordered  Monitor patient's weight and dietary intake as ordered or per policy  Utilize nutrition screening tool and intervene as necessary  Determine patient's food preferences and provide high-protein, high-caloric foods as appropriate       INTERVENTIONS:  - Monitor oral intake, urinary output, labs, and treatment plans  - Assess nutrition and hydration status and recommend course of action  - Evaluate amount of meals eaten  - Assist patient with eating if necessary   - Allow adequate time for meals  - Recommend/ encourage appropriate diets, oral nutritional supplements, and vitamin/mineral supplements  - Order, calculate, and assess calorie counts as needed  - Recommend, monitor, and adjust tube feedings and TPN/PPN based on assessed needs  - Assess need for intravenous fluids  - Provide specific nutrition/hydration education as appropriate  - Include patient/family/caregiver in decisions related to nutrition  Outcome: Progressing

## 2022-07-07 NOTE — ASSESSMENT & PLAN NOTE
Wt Readings from Last 3 Encounters:   07/04/22 125 kg (275 lb)     · Patient denies any shortness of breath on examination  · Lower extremities are swollen but patient states this is his baseline  · Continue home lasix

## 2022-07-07 NOTE — ASSESSMENT & PLAN NOTE
· Marked improvement in right lower extremity erythema and no warmth noted or tenderness to palpation on exam  · Meets SEPSIS criteria as below   · X-ray right lower extremity showing soft tissue swelling  · Blood culture with no growth at 3 day  · Keflex to complete full course of antibiotics on discharge  · Follow-up with primary care provider

## 2022-07-07 NOTE — PROGRESS NOTES
[facility] [facility]  Progress Note - Ene Yazan 1945, 64 y o  male MRN: [id number]  Unit/Bed#: -01 Encounter: [id number]  Primary Care Provider: No primary care provider on file  Date and time admitted to hospital: 7/4/2022  7:37 PM    * Cellulitis of right lower extremity  Assessment & Plan  · Right lower extremity swelling and erythema improving  · Meets SEPSIS criteria as below   · X-ray right lower extremity showing soft tissue swelling  · Blood culture with no growth at 2 day  · Continue IV cefazolin  · trend WBC, follow up with pending infectious labs, prn pain control     Acute kidney injury ([facility] )  Assessment & Plan  · Baseline creatinine appears to be around 1  · Creatinine back to baseline  · Continue to monitor    CHF (congestive heart failure) ([facility] )  Assessment & Plan  Wt Readings from Last 3 Encounters:   07/04/22 125 kg (275 lb)     · Patient denies any shortness of breath on examination  · Lower extremities are swollen but patient states this is his baseline  · Continue home lasix   · Continue to monitor    Pressure ulcer  Assessment & Plan  Patient with bilateral buttock stage III pressure ulcers that were present on admission    Sepsis ([facility] )  Assessment & Plan  · As evidenced by HR >90, WBC 17 31, and RLE cellulitis as above   · Plan as above  · Receiving IV abx as above    Hyponatremia  Assessment & Plan  · Resolved    T2DM (type 2 diabetes mellitus) ([facility] )  Assessment & Plan  · Hold home metformin  · [employer] SSI  · Hypoglycemia protocol  · Diabetic diet   (P) 122 [id number]      Rheumatoid arthritis (HCC)  Assessment & Plan  · Continue home sulfasalazine         VTE Pharmacologic Prophylaxis: VTE Score: 6 High Risk (Score >/= 5) - Pharmacological DVT Prophylaxis Ordered: heparin  Sequential Compression Devices Ordered  Patient Centered Rounds: I performed bedside rounds with nursing staff today    Discussions with Specialists or Other Care Team Provider: Case management    Education and Discussions with Family / Patient: Patient declined call to   Time Spent for Care: 30 minutes  More than 50% of total time spent on counseling and coordination of care as described above  Current Length of Stay: 3 day(s)  Current Patient Status: Inpatient   Certification Statement: The patient will continue to require additional inpatient hospital stay due to Cellulitis  Discharge Plan: Anticipate discharge tomorrow to home  Code Status: Level 1 - Full Code    Subjective:   Patient resting comfortably on examination  Patient had no overnight events or complaints on exam     Objective:     Vitals:   Temp (24hrs), Av 2 °F (36 8 °C), Min:98 °F (36 7 °C), Max:98 5 °F (36 9 °C)    Temp:  [98 °F (36 7 °C)-98 5 °F (36 9 °C)] 98 5 °F (36 9 °C)  HR:  [64-78] 64  Resp:  [14-20] 18  BP: (102-159)/(42-65) 159/65  SpO2:  [93 %-98 %] 98 %  Body mass index is 38 35 kg/m²  Input and Output Summary (last 24 hours): Intake/Output Summary (Last 24 hours) at 2022 0945  Last data filed at 2022 0526  Gross per 24 hour   Intake 730 ml   Output 1800 ml   Net -1070 ml       Physical Exam:   Physical Exam  Vitals and nursing note reviewed  Constitutional:       General: He is not in acute distress  Appearance: He is well-developed  HENT:      Head: Normocephalic and atraumatic  Eyes:      General: No scleral icterus  Conjunctiva/sclera: Conjunctivae normal    Cardiovascular:      Rate and Rhythm: Normal rate and regular rhythm  Heart sounds: Normal heart sounds  No murmur heard  No friction rub  No gallop  Pulmonary:      Effort: Pulmonary effort is normal  No respiratory distress  Breath sounds: Normal breath sounds  No wheezing or rales  Abdominal:      General: Bowel sounds are normal  There is no distension  Palpations: Abdomen is soft  Tenderness: There is no abdominal tenderness     Musculoskeletal:         General: Normal range of motion  Comments: Right lower extremity edema and erythema markedly improved   Skin:     General: Skin is warm  Findings: No rash  Neurological:      Mental Status: He is alert and oriented to person, place, and time  Additional Data:     Labs:  Results from last 7 days   Lab Units 07/07/22  0506 07/05/22  0506 07/04/22 2042   WBC Thousand/uL 3 73*   < > 17 31*   HEMOGLOBIN g/dL 10 3*   < > 13 6   HEMATOCRIT % 32 2*   < > 42 1   PLATELETS Thousands/uL 248   < > 186   BANDS PCT %  --   --  2   NEUTROS PCT % 63   < >  --    LYMPHS PCT % 14   < >  --    LYMPHO PCT %  --   --  6*   MONOS PCT % 8   < >  --    MONO PCT %  --   --  4   EOS PCT % 13*   < > 1    < > = values in this interval not displayed  Results from last 7 days   Lab Units 07/07/22  0506 07/05/22  0941 07/04/22 2042   SODIUM mmol/L 143   < > 131*   POTASSIUM mmol/L 4 1   < > 4 8   CHLORIDE mmol/L 109*   < > 98*   CO2 mmol/L 26   < > 25   BUN mg/dL 19   < > 30*   CREATININE mg/dL 0 92   < > 1 16   ANION GAP mmol/L 8   < > 8   CALCIUM mg/dL 8 2*   < > 8 5   ALBUMIN g/dL  --   --  2 6*   TOTAL BILIRUBIN mg/dL  --   --  1 24*   ALK PHOS U/L  --   --  77   ALT U/L  --   --  28   AST U/L  --   --  18   GLUCOSE RANDOM mg/dL 110   < > 136    < > = values in this interval not displayed       Results from last 7 days   Lab Units 07/04/22  2042   INR  1 16     Results from last 7 days   Lab Units 07/07/22  0633 07/06/22  2052 07/06/22  1638 07/06/22  1128 07/05/22  2119 07/05/22  1616 07/05/22  1051 07/05/22  0657 07/04/22  2350   POC GLUCOSE mg/dl 117 145* 106 104 120 131 138 110 149*         Results from last 7 days   Lab Units 07/04/22  2359 07/04/22  2042   LACTIC ACID mmol/L 2 1* 2 7*   PROCALCITONIN ng/ml  --  0 17       Lines/Drains:  Invasive Devices  Report    Peripheral Intravenous Line  Duration           Peripheral IV 07/04/22 Right Hand 2 days    Peripheral IV 07/06/22 Left Wrist 1 day Imaging: No pertinent imaging reviewed  Recent Cultures (last 7 days):   Results from last 7 days   Lab Units 07/04/22 2048 07/04/22 2042   BLOOD CULTURE  No Growth at 48 hrs  No Growth at 48 hrs  Last 24 Hours Medication List:   Current Facility-Administered Medications   Medication Dose Route Frequency Provider Last Rate    allopurinol  100 mg Oral Daily [place], [place]      atorvastatin  20 mg Oral Daily [place], [place]      carvedilol  12 5 mg Oral BID With Meals [facility], [place]      cefazolin  2,000 mg Intravenous Q8H Jack Vargas PA-C 2,000 mg (07/07/22 0829)    f[facility]  [facility]      heparin (porcine)  5,000 Units Subcutaneous [facility] Sammie Multani DO      insulin lispro  1-6 Units Subcutaneous HS [place], [place]      insulin lispro  2-12 Units Subcutaneous TID AC [place], [place]      levothyroxine  125 mcg Oral Daily [place], [place]      nystatin   Topical BID Sammie Multani DO      ondansetron  4 mg Intravenous Q8H PRN Sammie Multani DO      oxyCODONE  10 mg Oral Q6H PRN [place], [place]      sulfaSALAzine  1,000 mg Oral BID Jack Vargas PA-C          Today, Patient Was Seen By: Sammie Multani DO    **Please Note: This note may have been constructed using a voice recognition system  **

## 2022-07-08 VITALS
RESPIRATION RATE: 17 BRPM | BODY MASS INDEX: 38.5 KG/M2 | OXYGEN SATURATION: 95 % | TEMPERATURE: 97.4 F | SYSTOLIC BLOOD PRESSURE: 143 MMHG | DIASTOLIC BLOOD PRESSURE: 55 MMHG | HEART RATE: 67 BPM | HEIGHT: 71 IN | WEIGHT: 275 LBS

## 2022-07-08 LAB
ANION GAP SERPL CALCULATED.3IONS-SCNC: 6 MMOL/L (ref 4–13)
BASOPHILS # BLD AUTO: 0.02 THOUSANDS/ΜL (ref 0–0.1)
BASOPHILS NFR BLD AUTO: 1 % (ref 0–1)
BUN SERPL-MCNC: 15 MG/DL (ref 5–25)
CALCIUM SERPL-MCNC: 8 MG/DL (ref 8.3–10.1)
CHLORIDE SERPL-SCNC: 105 MMOL/L (ref 100–108)
CO2 SERPL-SCNC: 27 MMOL/L (ref 21–32)
CREAT SERPL-MCNC: 0.89 MG/DL (ref 0.6–1.3)
EOSINOPHIL # BLD AUTO: 0.45 THOUSAND/ΜL (ref 0–0.61)
EOSINOPHIL NFR BLD AUTO: 11 % (ref 0–6)
ERYTHROCYTE [DISTWIDTH] IN BLOOD BY AUTOMATED COUNT: 16 % (ref 11.6–15.1)
GFR SERPL CREATININE-BSD FRML MDRD: 81 ML/MIN/1.73SQ M
GLUCOSE SERPL-MCNC: 109 MG/DL (ref 65–140)
GLUCOSE SERPL-MCNC: 110 MG/DL (ref 65–140)
GLUCOSE SERPL-MCNC: 116 MG/DL (ref 65–140)
HCT VFR BLD AUTO: 32.3 % (ref 36.5–49.3)
HGB BLD-MCNC: 10.4 G/DL (ref 12–17)
IMM GRANULOCYTES # BLD AUTO: 0.03 THOUSAND/UL (ref 0–0.2)
IMM GRANULOCYTES NFR BLD AUTO: 1 % (ref 0–2)
LYMPHOCYTES # BLD AUTO: 0.49 THOUSANDS/ΜL (ref 0.6–4.47)
LYMPHOCYTES NFR BLD AUTO: 11 % (ref 14–44)
MCH RBC QN AUTO: 32.7 PG (ref 26.8–34.3)
MCHC RBC AUTO-ENTMCNC: 32.2 G/DL (ref 31.4–37.4)
MCV RBC AUTO: 102 FL (ref 82–98)
MONOCYTES # BLD AUTO: 0.34 THOUSAND/ΜL (ref 0.17–1.22)
MONOCYTES NFR BLD AUTO: 8 % (ref 4–12)
NEUTROPHILS # BLD AUTO: 2.97 THOUSANDS/ΜL (ref 1.85–7.62)
NEUTS SEG NFR BLD AUTO: 68 % (ref 43–75)
NRBC BLD AUTO-RTO: 0 /100 WBCS
PLATELET # BLD AUTO: 248 THOUSANDS/UL (ref 149–390)
PMV BLD AUTO: 8.8 FL (ref 8.9–12.7)
POTASSIUM SERPL-SCNC: 4 MMOL/L (ref 3.5–5.3)
RBC # BLD AUTO: 3.18 MILLION/UL (ref 3.88–5.62)
SODIUM SERPL-SCNC: 138 MMOL/L (ref 136–145)
WBC # BLD AUTO: 4.3 THOUSAND/UL (ref 4.31–10.16)

## 2022-07-08 PROCEDURE — 99239 HOSP IP/OBS DSCHRG MGMT >30: CPT | Performed by: INTERNAL MEDICINE

## 2022-07-08 PROCEDURE — 85025 COMPLETE CBC W/AUTO DIFF WBC: CPT | Performed by: INTERNAL MEDICINE

## 2022-07-08 PROCEDURE — 80048 BASIC METABOLIC PNL TOTAL CA: CPT | Performed by: INTERNAL MEDICINE

## 2022-07-08 PROCEDURE — 82948 REAGENT STRIP/BLOOD GLUCOSE: CPT

## 2022-07-08 RX ORDER — CEPHALEXIN 500 MG/1
500 CAPSULE ORAL EVERY 6 HOURS SCHEDULED
Qty: 20 CAPSULE | Refills: 0 | Status: SHIPPED | OUTPATIENT
Start: 2022-07-08 | End: 2022-07-13

## 2022-07-08 RX ADMIN — SULFASALAZINE 1000 MG: 500 TABLET ORAL at 08:37

## 2022-07-08 RX ADMIN — LEVOTHYROXINE SODIUM 125 MCG: 125 TABLET ORAL at 06:27

## 2022-07-08 RX ADMIN — NYSTATIN 1 APPLICATION: 100000 POWDER TOPICAL at 08:37

## 2022-07-08 RX ADMIN — FUROSEMIDE 40 MG: 40 TABLET ORAL at 08:37

## 2022-07-08 RX ADMIN — ATORVASTATIN CALCIUM 20 MG: 20 TABLET, FILM COATED ORAL at 08:37

## 2022-07-08 RX ADMIN — CEFAZOLIN SODIUM 2000 MG: 2 SOLUTION INTRAVENOUS at 08:37

## 2022-07-08 RX ADMIN — OXYCODONE HYDROCHLORIDE 10 MG: 10 TABLET ORAL at 08:37

## 2022-07-08 RX ADMIN — HEPARIN SODIUM 5000 UNITS: 5000 INJECTION INTRAVENOUS; SUBCUTANEOUS at 06:05

## 2022-07-08 RX ADMIN — CARVEDILOL 12.5 MG: 12.5 TABLET, FILM COATED ORAL at 08:37

## 2022-07-08 RX ADMIN — ALLOPURINOL 100 MG: 100 TABLET ORAL at 08:37

## 2022-07-08 NOTE — WOUND OSTOMY CARE
Progress Note - Wound   Pranay Rodrigez 64 y o  male MRN: [id number]  Unit/Bed#: -01 Encounter: [id number]      Assessment:   Patient admitted due to cellulitis of right lower extremity  History arthritis, edema, RA, CHF, diabetes  Wound care follow-up for buttock wound and new right lower extremity wound  Patient agreeable to assessment, alert and oriented x 4, continent of bowel and bladder, standby assist to stand for assessment, is OOB to chair, EHOB waffle cushion given to patient for home, is independent with most care  Primary RN made aware of assessment findings  Patient states buttock wound occurred approx 2 weeks ago due to sitting in his recliner often and wife has been assisting with wound care  Recommend patient follow-up with out-patient wound center, phone number left with patient and placed in discharge paperwork      1  Pressure injury right buttock, stage 3- POA- Left buttock wound has resolved on assessment  Wound is oval in shape, full-thickness, approx  30% thin yellow adhered tissue and 70% non-blanchable beefy red and pink tissue, with scant amount of serous drainage noted  Irina-wounds are dry, intact, blanchable pink skin, blanchable red skin, no warmth, no induration      2  Bilateral sacrum and heels- skin is dry, intact, blanchable pink      3  Right posterior lower extremity- Wound is irregular in shape, a serous filled blister with distal edge beginning to unroof during wound cleansing  Wound is full-thickness, approx  40% pink tissue and 60% yellow tissue visible, with moderate amount of serous drainage noted  Irina-wound is dry, intact, edematous, pink skin  Patient states he has lymphedema and uses pumps at home to aide with leg swelling  4  Left lower extremity- Skin is dry, intact, edematous, pink skin, no wounds noted      Educated patient on importance of frequent offloading of pressure via turning, repositioning, and weight-shifting   Verbalized understanding of plan of care      No induration, fluctuance, odor, warmth, or purulence noted to the above noted wounds  Patient tolerated well, reports mild pain to the wounds  Primary nurse aware of plan of care  See flow sheets for more detailed assessment findings  Will follow along         Skin care plans:  1-Cleanse sacro-buttocks with soap and water  Apply TRIAD paste to open areas on open area on buttock at least twice a day and as needed with jacky-care  2-Hydraguard to bilateral heel twice a day and as needed  3-Elevate heels to offload pressure  4-Ehob cushion when out of bed  5-Turn/repoisiton every 2 hours for pressure re-distribution on skin  6-Moisturize skin daily with skin nourishing cream    7- Right leg wounds- Cleanse with Normal Saline solution, pat dry  Apply a piece of Maxorb calcium alginate over open wound bed, cover with 4x4 gauze, then ABD pad, and wrap with Lizzy  Change once or twice a day depending on amount of drainage from wound  8-Apply Accu-max pump to mattress  Wound 07/05/22 Buttocks (Active)   Wound Image   07/08/22 0958   Wound Description Beefy red;Pink 07/08/22 0958   Pressure Injury Stage 3 07/08/22 0958   Jacky-wound Assessment Dry; Intact; Pink 07/08/22 0958   Wound Length (cm) 1 8 cm 07/08/22 0958   Wound Width (cm) 2 cm 07/08/22 0958   Wound Depth (cm) 0 2 cm 07/08/22 0958   Wound Surface Area (cm^2) 3 6 cm^2 07/08/22 0958   Wound Volume (cm^3) 0 72 cm^3 07/08/22 0958   Calculated Wound Volume (cm^3) 0 72 cm^3 07/08/22 0958   Change in Wound Size % 40 07/08/22 0958   Tunneling 0 cm 07/08/22 0958   Undermining 0 07/08/22 0958   Drainage Amount Scant 07/08/22 0958   Drainage Description Serous 07/08/22 0958   Non-staged Wound Description Full thickness 07/08/22 0958   Treatments Cleansed;Irrigation with NSS;Site care 07/08/22 0958   Dressing Other (Comment) 07/08/22 0958   Wound packed?  No 07/08/22 0958   Dressing Changed New 07/08/22 0958   Patient Tolerance Tolerated well 07/08/22 9361 Wound 07/08/22 Tibial Posterior;Right (Active)   Wound Image   07/08/22 0956   Wound Description Yellow;Pink;Drainage 07/08/22 0956   Irina-wound Assessment Dry; Intact;Edema;Pink 07/08/22 0956   Wound Length (cm) 8 cm 07/08/22 0956   Wound Width (cm) 7 cm 07/08/22 0956   Wound Depth (cm) 0 1 cm 07/08/22 0956   Wound Surface Area (cm^2) 56 cm^2 07/08/22 0956   Wound Volume (cm^3) 5 6 cm^3 07/08/22 0956   Calculated Wound Volume (cm^3) 5 6 cm^3 07/08/22 0956   Tunneling 0 cm 07/08/22 0956   Undermining 0 07/08/22 0956   Drainage Amount Moderate 07/08/22 0956   Drainage Description Serous 07/08/22 0956   Non-staged Wound Description Full thickness 07/08/22 0956   Treatments Cleansed;Irrigation with NSS;Site care 07/08/22 0956   Dressing Calcium Alginate with Silver;ABD;Dry dressing 07/08/22 0956   Wound packed? No 07/08/22 0956   Dressing Changed Changed 07/08/22 0956   Patient Tolerance Tolerated well 07/08/22 0956   Dressing Status Clean;Dry; Intact 07/08/22 0956       Call or tigertext with any questions  Wound Care will continue to follow    Malcolm MELON RN [employer]  Wound care

## 2022-07-08 NOTE — PLAN OF CARE
Problem: CARDIOVASCULAR - ADULT  Goal: Maintains optimal cardiac output and hemodynamic stability  Description: INTERVENTIONS:  - Monitor I/O, vital signs and rhythm  - Monitor for S/S and trends of decreased cardiac output  - Administer and titrate ordered vasoactive medications to optimize hemodynamic stability  - Assess quality of pulses, skin color and temperature  - Assess for signs of decreased coronary artery perfusion  - Instruct patient to report change in severity of symptoms  Outcome: Progressing  Goal: Absence of cardiac dysrhythmias or at baseline rhythm  Description: INTERVENTIONS:  - Continuous cardiac monitoring, vital signs, obtain 12 lead EKG if ordered  - Administer antiarrhythmic and heart rate control medications as ordered  - Monitor electrolytes and administer replacement therapy as ordered  Outcome: Progressing     Problem: GASTROINTESTINAL - ADULT  Goal: Minimal or absence of nausea and/or vomiting  Description: INTERVENTIONS:  - Administer IV fluids if ordered to ensure adequate hydration  - Maintain NPO status until nausea and vomiting are resolved  - Nasogastric tube if ordered  - Administer ordered antiemetic medications as needed  - Provide nonpharmacologic comfort measures as appropriate  - Advance diet as tolerated, if ordered  - Consider nutrition services referral to assist patient with adequate nutrition and appropriate food choices  Outcome: Progressing  Goal: Maintains or returns to baseline bowel function  Description: INTERVENTIONS:  - Assess bowel function  - Encourage oral fluids to ensure adequate hydration  - Administer IV fluids if ordered to ensure adequate hydration  - Administer ordered medications as needed  - Encourage mobilization and activity  - Consider nutritional services referral to assist patient with adequate nutrition and appropriate food choices  Outcome: Progressing  Goal: Maintains adequate nutritional intake  Description: INTERVENTIONS:  - Monitor percentage of each meal consumed  - Identify factors contributing to decreased intake, treat as appropriate  - Assist with meals as needed  - Monitor I&O, weight, and lab values if indicated  - Obtain nutrition services referral as needed  Outcome: Progressing  Goal: Establish and maintain optimal ostomy function  Description: INTERVENTIONS:  - Assess bowel function  - Encourage oral fluids to ensure adequate hydration  - Administer IV fluids if ordered to ensure adequate hydration   - Administer ordered medications as needed  - Encourage mobilization and activity  - Nutrition services referral to assist patient with appropriate food choices  - Assess stoma site  - Consider wound care consult   Outcome: Progressing  Goal: Oral mucous membranes remain intact  Description: INTERVENTIONS  - Assess oral mucosa and hygiene practices  - Implement preventative oral hygiene regimen  - Implement oral medicated treatments as ordered  - Initiate Nutrition services referral as needed  Outcome: Progressing     Problem: METABOLIC, FLUID AND ELECTROLYTES - ADULT  Goal: Electrolytes maintained within normal limits  Description: INTERVENTIONS:  - Monitor labs and assess patient for signs and symptoms of electrolyte imbalances  - Administer electrolyte replacement as ordered  - Monitor response to electrolyte replacements, including repeat lab results as appropriate  - Instruct patient on fluid and nutrition as appropriate  Outcome: Progressing  Goal: Fluid balance maintained  Description: INTERVENTIONS:  - Monitor labs   - Monitor I/O and WT  - Instruct patient on fluid and nutrition as appropriate  - Assess for signs & symptoms of volume excess or deficit  Outcome: Progressing  Goal: Glucose maintained within target range  Description: INTERVENTIONS:  - Monitor Blood Glucose as ordered  - Assess for signs and symptoms of hyperglycemia and hypoglycemia  - Administer ordered medications to maintain glucose within target range  - Assess nutritional intake and initiate nutrition service referral as needed  Outcome: Progressing     Problem: HEMATOLOGIC - ADULT  Goal: Maintains hematologic stability  Description: INTERVENTIONS  - Assess for signs and symptoms of bleeding or hemorrhage  - Monitor labs  - Administer supportive blood products/factors as ordered and appropriate  Outcome: Progressing

## 2022-07-08 NOTE — PLAN OF CARE
Problem: MOBILITY - ADULT  Goal: Maintain or return to baseline ADL function  Description: INTERVENTIONS:  -  Assess patient's ability to carry out ADLs; assess patient's baseline for ADL function and identify physical deficits which impact ability to perform ADLs (bathing, care of mouth/teeth, toileting, grooming, dressing, etc )  - Assess/evaluate cause of self-care deficits   - Assess range of motion  - Assess patient's mobility; develop plan if impaired  - Assess patient's need for assistive devices and provide as appropriate  - Encourage maximum independence but intervene and supervise when necessary  - Involve family in performance of ADLs  - Assess for home care needs following discharge   - Consider OT consult to assist with ADL evaluation and planning for discharge  - Provide patient education as appropriate  Outcome: Adequate for Discharge  Goal: Maintains/Returns to pre admission functional level  Description: INTERVENTIONS:  - Perform BMAT or MOVE assessment daily    - Set and communicate daily mobility goal to care team and patient/family/caregiver  - Collaborate with rehabilitation services on mobility goals if consulted  - Perform Range of Motion  times a day  - Reposition patient every  hours    - Dangle patient  times a day  - Stand patient  times a day  - Ambulate patient  times a day  - Out of bed to chair  times a day   - Out of bed for meals  times a day  - Out of bed for toileting  - Record patient progress and toleration of activity level   Outcome: Adequate for Discharge     Problem: PAIN - ADULT  Goal: Verbalizes/displays adequate comfort level or baseline comfort level  Description: Interventions:  - Encourage patient to monitor pain and request assistance  - Assess pain using appropriate pain scale  - Administer analgesics based on type and severity of pain and evaluate response  - Implement non-pharmacological measures as appropriate and evaluate response  - Consider cultural and social influences on pain and pain management  - Notify physician/advanced practitioner if interventions unsuccessful or patient reports new pain  Outcome: Adequate for Discharge     Problem: INFECTION - ADULT  Goal: Absence or prevention of progression during hospitalization  Description: INTERVENTIONS:  - Assess and monitor for signs and symptoms of infection  - Monitor lab/diagnostic results  - Monitor all insertion sites, i e  indwelling lines, tubes, and drains  - Monitor endotracheal if appropriate and nasal secretions for changes in amount and color  - [place] appropriate cooling/warming therapies per order  - Administer medications as ordered  - Instruct and encourage patient and family to use good hand hygiene technique  - Identify and instruct in appropriate isolation precautions for identified infection/condition  Outcome: Adequate for Discharge     Problem: SAFETY ADULT  Goal: Patient will remain free of falls  Description: INTERVENTIONS:  - Educate patient/family on patient safety including physical limitations  - Instruct patient to call for assistance with activity   - Consult OT/PT to assist with strengthening/mobility   - Keep Call bell within reach  - Keep bed low and locked with side rails adjusted as appropriate  - Keep care items and personal belongings within reach  - Initiate and maintain comfort rounds  - Make Fall Risk Sign visible to staff  - Offer Toileting every  Hours, in advance of need  - Initiate/Maintain alarm  - Obtain necessary fall risk management equipment:   - Apply yellow socks and bracelet for high fall risk patients  - Consider moving patient to room near nurses station  Outcome: Adequate for Discharge     Problem: DISCHARGE PLANNING  Goal: Discharge to home or other facility with appropriate resources  Description: INTERVENTIONS:  - Identify barriers to discharge w/patient and caregiver  - Arrange for needed discharge resources and transportation as appropriate  - Identify discharge learning needs (meds, wound care, etc )  - Arrange for interpretive services to assist at discharge as needed  - Refer to Case Management Department for coordinating discharge planning if the patient needs post-hospital services based on physician/advanced practitioner order or complex needs related to functional status, cognitive ability, or social support system  Outcome: Adequate for Discharge     Problem: Knowledge Deficit  Goal: Patient/family/caregiver demonstrates understanding of disease process, treatment plan, medications, and discharge instructions  Description: Complete learning assessment and assess knowledge base    Interventions:  - Provide teaching at level of understanding  - Provide teaching via preferred learning methods  Outcome: Adequate for Discharge     Problem: Prexisting or High Potential for Compromised Skin Integrity  Goal: Skin integrity is maintained or improved  Description: INTERVENTIONS:  - Identify patients at risk for skin breakdown  - Assess and monitor skin integrity  - Assess and monitor nutrition and hydration status  - Monitor labs   - Assess for incontinence   - Turn and reposition patient  - Assist with mobility/ambulation  - Relieve pressure over bony prominences  - Avoid friction and shearing  - Provide appropriate hygiene as needed including keeping skin clean and dry  - Evaluate need for skin moisturizer/barrier cream  - Collaborate with interdisciplinary team   - Patient/family teaching  - Consider wound care consult   Outcome: Adequate for Discharge     Problem: Potential for Falls  Goal: Patient will remain free of falls  Description: INTERVENTIONS:  - Educate patient/family on patient safety including physical limitations  - Instruct patient to call for assistance with activity   - Consult OT/PT to assist with strengthening/mobility   - Keep Call bell within reach  - Keep bed low and locked with side rails adjusted as appropriate  - Keep care items and personal belongings within reach  - Initiate and maintain comfort rounds  - Make Fall Risk Sign visible to staff  - Offer Toileting every  Hours, in advance of need  - Initiate/Maintain alarm  - Obtain necessary fall risk management equipment:   - Apply yellow socks and bracelet for high fall risk patients  - Consider moving patient to room near nurses station  Outcome: Adequate for Discharge     Problem: PHYSICAL THERAPY ADULT  Goal: Performs mobility at highest level of function for planned discharge setting  See evaluation for individualized goals  Description  Outcome: Adequate for Discharge     Problem: OCCUPATIONAL THERAPY ADULT  Goal: Performs self-care activities at highest level of function for planned discharge setting  See evaluation for individualized goals  Description:   Outcome: Adequate for Discharge     Problem: SLP ADULT - SWALLOWING, IMPAIRED  Goal: Initial SLP swallow eval performed  Outcome: Adequate for Discharge  Goal: Advance to least restrictive diet without signs or symptoms of aspiration for planned discharge setting  See evaluation for individualized goals  Description: Patient will:    1  Tolerate  with no S/S of  across meals   2  Demonstrate effective    3  Use    Outcome: Adequate for Discharge     Problem: SLP ADULT - COMMUNICATION, IMPAIRED  Goal: Initial communication eval performed  Outcome: Adequate for Discharge  Goal: Demonstrates communication skills at highest level of function for planned discharge setting  See evaluation for individualized goals  Description: Patient will be able to:    1  Increase ability to  with % accuracy   2  Demostrate reading comprehension at  with % accuracy   3  Increase ability to write  with %   4  Demonstrate auditory comprehension of  with % accuracy   5   with % accuracy       Outcome: Adequate for Discharge     Problem: SLP ADULT - THOUGHT PROCESS, DISTURBED  Goal: Initial thought process eval performed  Outcome: Adequate for Discharge  Goal: Demonstrates cognitive skills at highest level of function for planned discharge setting  See evaluation for individualized goals  Description: Patient will be able to demonstrate:    1  Orientation to  consistently  2   Increase recall of salient long term information including biographical information with % accuracy   3    Increase recall of salient recent events with % accuracy   4  Increase short term memory for new information across days to % accuracy   5  Increase recall of strategies to maximize performance in cognitive language tasks to % accuracy   6   Ability to solve simple problems in ADL tasks with % accuracy   7   Ability to solve complex/advanced problems in ADL tasks and high level cognitive activities with % accuracy     Outcome: Adequate for Discharge     Problem: NEUROSENSORY - ADULT  Goal: Achieves stable or improved neurological status  Description: INTERVENTIONS  - Monitor and report changes in neurological status  - Monitor vital signs such as temperature, blood pressure, glucose, and any other labs ordered   - Initiate measures to prevent increased intracranial pressure  - Monitor for seizure activity and implement precautions if appropriate      Outcome: Adequate for Discharge  Goal: Remains free of injury related to seizures activity  Description: INTERVENTIONS  - Maintain airway, patient safety  and administer oxygen as ordered  - Monitor patient for seizure activity, document and report duration and description of seizure to physician/advanced practitioner  - If seizure occurs,  ensure patient safety during seizure  - Reorient patient post seizure  - Seizure pads on all 4 side rails  - Instruct patient/family to notify RN of any seizure activity including if an aura is experienced  - Instruct patient/family to call for assistance with activity based on nursing assessment  - Administer anti-seizure medications if ordered    Outcome: Adequate for Discharge  Goal: Achieves maximal functionality and self care  Description: INTERVENTIONS  - Monitor swallowing and airway patency with patient fatigue and changes in neurological status  - Encourage and assist patient to increase activity and self care     - Encourage visually impaired, hearing impaired and aphasic patients to use assistive/communication devices  Outcome: Adequate for Discharge     Problem: CARDIOVASCULAR - ADULT  Goal: Maintains optimal cardiac output and hemodynamic stability  Description: INTERVENTIONS:  - Monitor I/O, vital signs and rhythm  - Monitor for S/S and trends of decreased cardiac output  - Administer and titrate ordered vasoactive medications to optimize hemodynamic stability  - Assess quality of pulses, skin color and temperature  - Assess for signs of decreased coronary artery perfusion  - Instruct patient to report change in severity of symptoms  Outcome: Adequate for Discharge  Goal: Absence of cardiac dysrhythmias or at baseline rhythm  Description: INTERVENTIONS:  - Continuous cardiac monitoring, vital signs, obtain 12 lead EKG if ordered  - Administer antiarrhythmic and heart rate control medications as ordered  - Monitor electrolytes and administer replacement therapy as ordered  Outcome: Adequate for Discharge     Problem: RESPIRATORY - ADULT  Goal: Achieves optimal ventilation and oxygenation  Description: INTERVENTIONS:  - Assess for changes in respiratory status  - Assess for changes in mentation and behavior  - Position to facilitate oxygenation and minimize respiratory effort  - Oxygen administered by appropriate delivery if ordered  - Initiate smoking cessation education as indicated  - Encourage broncho-pulmonary hygiene including cough, deep breathe, Incentive Spirometry  - Assess the need for suctioning and aspirate as needed  - Assess and instruct to report SOB or any respiratory difficulty  - Respiratory Therapy support as indicated  Outcome: Adequate for Discharge     Problem: GASTROINTESTINAL - ADULT  Goal: Minimal or absence of nausea and/or vomiting  Description: INTERVENTIONS:  - Administer IV fluids if ordered to ensure adequate hydration  - Maintain NPO status until nausea and vomiting are resolved  - Nasogastric tube if ordered  - Administer ordered antiemetic medications as needed  - Provide nonpharmacologic comfort measures as appropriate  - Advance diet as tolerated, if ordered  - Consider nutrition services referral to assist patient with adequate nutrition and appropriate food choices  Outcome: Adequate for Discharge  Goal: Maintains or returns to baseline bowel function  Description: INTERVENTIONS:  - Assess bowel function  - Encourage oral fluids to ensure adequate hydration  - Administer IV fluids if ordered to ensure adequate hydration  - Administer ordered medications as needed  - Encourage mobilization and activity  - Consider nutritional services referral to assist patient with adequate nutrition and appropriate food choices  Outcome: Adequate for Discharge  Goal: Maintains adequate nutritional intake  Description: INTERVENTIONS:  - Monitor percentage of each meal consumed  - Identify factors contributing to decreased intake, treat as appropriate  - Assist with meals as needed  - Monitor I&O, weight, and lab values if indicated  - Obtain nutrition services referral as needed  Outcome: Adequate for Discharge  Goal: Establish and maintain optimal ostomy function  Description: INTERVENTIONS:  - Assess bowel function  - Encourage oral fluids to ensure adequate hydration  - Administer IV fluids if ordered to ensure adequate hydration   - Administer ordered medications as needed  - Encourage mobilization and activity  - Nutrition services referral to assist patient with appropriate food choices  - Assess stoma site  - Consider wound care consult   Outcome: Adequate for Discharge  Goal: Oral mucous membranes remain intact  Description: INTERVENTIONS  - Assess oral mucosa and hygiene practices  - Implement preventative oral hygiene regimen  - Implement oral medicated treatments as ordered  - Initiate Nutrition services referral as needed  Outcome: Adequate for Discharge     Problem: GENITOURINARY - ADULT  Goal: Maintains or returns to baseline urinary function  Description: INTERVENTIONS:  - Assess urinary function  - Encourage oral fluids to ensure adequate hydration if ordered  - Administer IV fluids as ordered to ensure adequate hydration  - Administer ordered medications as needed  - Offer frequent toileting  - Follow urinary retention protocol if ordered  Outcome: Adequate for Discharge  Goal: Absence of urinary retention  Description: INTERVENTIONS:  - Assess patients ability to void and empty bladder  - Monitor I/O  - Bladder scan as needed  - Discuss with physician/AP medications to alleviate retention as needed  - Discuss catheterization for long term situations as appropriate  Outcome: Adequate for Discharge  Goal: Urinary catheter remains patent  Description: INTERVENTIONS:  - Assess patency of urinary catheter  - If patient has a chronic reyes, consider changing catheter if non-functioning  - Follow guidelines for intermittent irrigation of non-functioning urinary catheter  Outcome: Adequate for Discharge     Problem: METABOLIC, FLUID AND ELECTROLYTES - ADULT  Goal: Electrolytes maintained within normal limits  Description: INTERVENTIONS:  - Monitor labs and assess patient for signs and symptoms of electrolyte imbalances  - Administer electrolyte replacement as ordered  - Monitor response to electrolyte replacements, including repeat lab results as appropriate  - Instruct patient on fluid and nutrition as appropriate  Outcome: Adequate for Discharge  Goal: Fluid balance maintained  Description: INTERVENTIONS:  - Monitor labs   - Monitor I/O and WT  - Instruct patient on fluid and nutrition as appropriate  - Assess for signs & symptoms of volume excess or deficit  Outcome: Adequate for Discharge  Goal: Glucose maintained within target range  Description: INTERVENTIONS:  - Monitor Blood Glucose as ordered  - Assess for signs and symptoms of hyperglycemia and hypoglycemia  - Administer ordered medications to maintain glucose within target range  - Assess nutritional intake and initiate nutrition service referral as needed  Outcome: Adequate for Discharge     Problem: SKIN/TISSUE INTEGRITY - ADULT  Goal: Skin Integrity remains intact(Skin Breakdown Prevention)  Description: Assess:  -Perform Parker assessment every   -Clean and moisturize skin every   -Inspect skin when repositioning, toileting, and assisting with ADLS  -Assess under medical devices such as  every   -Assess extremities for adequate circulation and sensation     Bed Management:  -Have minimal linens on bed & keep smooth, unwrinkled  -Change linens as needed when moist or perspiring  -Avoid sitting or lying in one position for more than  hours while in bed  -Keep HOB at degrees     Toileting:  -Offer bedside commode  -Assess for incontinence every   -Use incontinent care products after each incontinent episode such as     Activity:  -Mobilize patient  times a day  -Encourage activity and walks on unit  -Encourage or provide ROM exercises   -Turn and reposition patient every  Hours  -Use appropriate equipment to lift or move patient in bed  -Instruct/ Assist with weight shifting every  when out of bed in chair  -Consider limitation of chair time  hour intervals    Skin Care:  -Avoid use of baby powder, tape, friction and shearing, hot water or constrictive clothing  -Relieve pressure over bony prominences using   -Do not massage red bony areas    Next Steps:  -Teach patient strategies to minimize risks such as    -Consider consults to  interdisciplinary teams such as   Outcome: Adequate for Discharge  Goal: Incision(s), wounds(s) or drain site(s) healing without S/S of infection  Description: INTERVENTIONS  - Assess and document dressing, incision, wound bed, drain sites and surrounding tissue  - Provide patient and family education  - Perform skin care/dressing changes every   Outcome: Adequate for Discharge  Goal: Pressure injury heals and does not worsen  Description: Interventions:  - Implement low air loss mattress or specialty surface (Criteria met)  - Apply silicone foam dressing  - Instruct/assist with weight shifting every  minutes when in chair   - Limit chair time to  hour intervals  - Use special pressure reducing interventions such as  when in chair   - Apply fecal or urinary incontinence containment device   - Perform passive or active ROM every   - Turn and reposition patient & offload bony prominences every  hours   - Utilize friction reducing device or surface for transfers   - Consider consults to  interdisciplinary teams such as   - Use incontinent care products after each incontinent episode such as   - Consider nutrition services referral as needed  Outcome: Adequate for Discharge     Problem: HEMATOLOGIC - ADULT  Goal: Maintains hematologic stability  Description: INTERVENTIONS  - Assess for signs and symptoms of bleeding or hemorrhage  - Monitor labs  - Administer supportive blood products/factors as ordered and appropriate  Outcome: Adequate for Discharge     Problem: MUSCULOSKELETAL - ADULT  Goal: Maintain or return mobility to safest level of function  Description: INTERVENTIONS:  - Assess patient's ability to carry out ADLs; assess patient's baseline for ADL function and identify physical deficits which impact ability to perform ADLs (bathing, care of mouth/teeth, toileting, grooming, dressing, etc )  - Assess/evaluate cause of self-care deficits   - Assess range of motion  - Assess patient's mobility  - Assess patient's need for assistive devices and provide as appropriate  - Encourage maximum independence but intervene and supervise when necessary  - Involve family in performance of ADLs  - Assess for home care needs following discharge   - Consider OT consult to assist with ADL evaluation and planning for discharge  - Provide patient education as appropriate  Outcome: Adequate for Discharge  Goal: Maintain proper alignment of affected body part  Description: INTERVENTIONS:  - Support, maintain and protect limb and body alignment  - Provide patient/ family with appropriate education  Outcome: Adequate for Discharge     Problem: Nutrition/Hydration-ADULT  Goal: Nutrient/Hydration intake appropriate for improving, restoring or maintaining nutritional needs  Description: Monitor and assess patient's nutrition/hydration status for malnutrition  Collaborate with interdisciplinary team and initiate plan and interventions as ordered  Monitor patient's weight and dietary intake as ordered or per policy  Utilize nutrition screening tool and intervene as necessary  Determine patient's food preferences and provide high-protein, high-caloric foods as appropriate       INTERVENTIONS:  - Monitor oral intake, urinary output, labs, and treatment plans  - Assess nutrition and hydration status and recommend course of action  - Evaluate amount of meals eaten  - Assist patient with eating if necessary   - Allow adequate time for meals  - Recommend/ encourage appropriate diets, oral nutritional supplements, and vitamin/mineral supplements  - Order, calculate, and assess calorie counts as needed  - Recommend, monitor, and adjust tube feedings and TPN/PPN based on assessed needs  - Assess need for intravenous fluids  - Provide specific nutrition/hydration education as appropriate  - Include patient/family/caregiver in decisions related to nutrition  Outcome: Adequate for Discharge

## 2022-07-08 NOTE — DISCHARGE SUMMARY
[address] [facility]  Discharge- Suanne Leyden 1945, 64 y o  male MRN: [id number]  Unit/Bed#: -01 Encounter: [id number]  Primary Care Provider: No primary care provider on file  Date and time admitted to hospital: 7/4/2022  7:37 PM    * Cellulitis of right lower extremity  Assessment & Plan  · Marked improvement in right lower extremity erythema and no warmth noted or tenderness to palpation on exam  · Meets SEPSIS criteria as below   · X-ray right lower extremity showing soft tissue swelling  · Blood culture with no growth at 3 day  · Keflex to complete full course of antibiotics on discharge  · Follow-up with primary care provider    Acute kidney injury [facility])  Assessment & Plan  · Baseline creatinine appears to be around 1  · Creatinine back to baseline    CHF (congestive heart failure) ([facility] [address] )  Assessment & Plan  Wt Readings from Last 3 Encounters:   07/04/22 125 kg (275 lb)     · Patient denies any shortness of breath on examination  · Lower extremities are swollen but patient states this is his baseline  · Continue home lasix     Pressure ulcer  Assessment & Plan  Patient with bilateral buttock stage III pressure ulcers that were present on admission    Sepsis ([facility] [address] )  Assessment & Plan  · As evidenced by HR >90, WBC 17 31, and RLE cellulitis as above   · Plan as above    Hyponatremia  Assessment & Plan  · Resolved    T2DM (type 2 diabetes mellitus) ([facility] [address] )  Assessment & Plan  · Continue home diabetic regimen on discharge  (P) 124 1      Rheumatoid arthritis ([facility] [address] )  Assessment & Plan  · Continue home sulfasalazine       Medical Problems             Resolved Problems  Date Reviewed: 7/4/2022   None               Discharging Physician / Practitioner: Dany Tuttle DO  PCP: No primary care provider on file    Admission Date:   Admission Orders (From admission, onward)     Ordered        07/04/22 2228  Inpatient Admission  Once                      Discharge Date: 07/08/22    Consultations During Hospital Stay:  · none    Complications:  none    Reason for Admission:  Cellulitis    Hospital Course:   Ricardo Hinton is a 64 y o  male patient who originally presented to the hospital on 7/4/2022 due to cellulitis  Patient came in secondary to worsening right lower extremity cellulitis  Patient was treated with IV antibiotics with marked improvement in lower extremity cellulitis  Patient does have chronic lymphedema which likely contributed to his cellulitis  Patient states he is compliant with his outpatient treatments and did discuss importance of continuing this  Patient will be given course of antibiotics on discharge  Patient is stable for discharge at this time  Please see above list of diagnoses and related plan for additional information  Condition at Discharge: stable    Discharge Day Visit / Exam:   Subjective:  Patient resting comfortably on examination  Patient had no overnight events or complaints on exam this morning  Vitals: Blood Pressure: 143/55 (07/08/22 0755)  Pulse: 67 (07/08/22 0755)  Temperature: (!) 97 4 °F (36 3 °C) (07/08/22 0755)  Temp Source: Oral (07/07/22 0524)  Respirations: 17 (07/08/22 0755)  Height: 5' 11" (180 3 cm) (07/04/22 1931)  Weight - Scale: 125 kg (275 lb) (07/04/22 1931)  SpO2: 95 % (07/08/22 0755)     Exam:   Physical Exam  Vitals and nursing note reviewed  Constitutional:       General: He is not in acute distress  Appearance: He is well-developed  HENT:      Head: Normocephalic and atraumatic  Eyes:      General: No scleral icterus  Conjunctiva/sclera: Conjunctivae normal    Cardiovascular:      Rate and Rhythm: Normal rate and regular rhythm  Heart sounds: Normal heart sounds  No murmur heard  No friction rub  No gallop  Pulmonary:      Effort: Pulmonary effort is normal  No respiratory distress  Breath sounds: Normal breath sounds  No wheezing or rales     Abdominal:      General: Bowel sounds are normal  There is no distension  Palpations: Abdomen is soft  Tenderness: There is no abdominal tenderness  Musculoskeletal:         General: Normal range of motion  Comments: Right lower extremity erythema markedly improved and no warmth or tenderness to palpation on exam   Skin:     General: Skin is warm  Findings: No rash  Neurological:      Mental Status: He is alert and oriented to person, place, and time  Discussion with Family: Updated  (wife) via phone  Discharge instructions/Information to patient and family:   See after visit summary for information provided to patient and family  Provisions for Follow-Up Care:  See after visit summary for information related to follow-up care and any pertinent home health orders  Disposition:   Home    Planned Readmission: none     Discharge Statement:  I spent 40 minutes discharging the patient  This time was spent on the day of discharge  I had direct contact with the patient on the day of discharge  Greater than 50% of the total time was spent examining patient, answering all patient questions, arranging and discussing plan of care with patient as well as directly providing post-discharge instructions  Additional time then spent on discharge activities  Discharge Medications:  See after visit summary for reconciled discharge medications provided to patient and/or family        **Please Note: This note may have been constructed using a voice recognition system**

## 2022-07-10 LAB
BACTERIA BLD CULT: NORMAL
BACTERIA BLD CULT: NORMAL